# Patient Record
Sex: MALE | Race: WHITE | URBAN - METROPOLITAN AREA
[De-identification: names, ages, dates, MRNs, and addresses within clinical notes are randomized per-mention and may not be internally consistent; named-entity substitution may affect disease eponyms.]

---

## 2019-05-17 ENCOUNTER — HOSPITAL ENCOUNTER (OUTPATIENT)
Dept: NUTRITION | Age: 24
Discharge: HOME | End: 2019-05-17

## 2021-07-06 ENCOUNTER — OFFICE VISIT (OUTPATIENT)
Dept: ORTHOPEDIC SURGERY | Age: 26
End: 2021-07-06
Payer: COMMERCIAL

## 2021-07-06 VITALS — OXYGEN SATURATION: 98 % | HEIGHT: 72 IN | TEMPERATURE: 96.9 F | HEART RATE: 78 BPM

## 2021-07-06 DIAGNOSIS — S83.511A RUPTURE OF ANTERIOR CRUCIATE LIGAMENT OF RIGHT KNEE, INITIAL ENCOUNTER: Primary | ICD-10-CM

## 2021-07-06 DIAGNOSIS — S86.891A AVULSION OF RIGHT PATELLAR TENDON, INITIAL ENCOUNTER: ICD-10-CM

## 2021-07-06 PROCEDURE — 99204 OFFICE O/P NEW MOD 45 MIN: CPT | Performed by: PHYSICIAN ASSISTANT

## 2021-07-06 RX ORDER — NAPROXEN 500 MG/1
500 TABLET ORAL 2 TIMES DAILY WITH MEALS
COMMUNITY
End: 2021-08-25 | Stop reason: ALTCHOICE

## 2021-07-06 RX ORDER — ATENOLOL 50 MG/1
50 TABLET ORAL DAILY
COMMUNITY

## 2021-07-06 NOTE — PROGRESS NOTES
Gilbert Kamara  1995   Chief Complaint   Patient presents with    Knee Pain     right knee        HISTORY OF PRESENT ILLNESS  Gilbert Kamara is a 26 y.o. male who presents today for evaluation of acute right knee pain. Patient was swinging a baseball bat yesterday afternoon on July 5, 2021 he felt a pop in his knee and has been unable to bear weight since. He went to [facility] first who gave him an Ace wrap and crutches and naproxen. He states it hurts to bend his knee. He feels like his knee is unstable and is going to buckle on him pain is a 8/10. Has tried following treatments: Injections:NO; Brace:YES; Therapy:NO; Cane/Crutch:YES       Not on File     Past Medical History:   Diagnosis Date    Hypertension       Social History     Socioeconomic History    Marital status: UNKNOWN     Spouse name: Not on file    Number of children: Not on file    Years of education: Not on file    Highest education level: Not on file   Occupational History    Not on file   Tobacco Use    Smoking status: Never Smoker    Smokeless tobacco: Never Used   Substance and Sexual Activity    Alcohol use: Never    Drug use: Never    Sexual activity: Not on file   Other Topics Concern    Not on file   Social History Narrative    Not on file     Social Determinants of Health     Financial Resource Strain:     Difficulty of Paying Living Expenses:    Food Insecurity:     Worried About Running Out of Food in the Last Year:     [address] in the Last Year:    Transportation Needs:     Lack of Transportation (Medical):      Lack of Transportation (Non-Medical):    Physical Activity:     Days of Exercise per Week:     Minutes of Exercise per Session:    Stress:     Feeling of Stress :    Social Connections:     Frequency of Communication with Friends and Family:     Frequency of Social Gatherings with Friends and Family:     Attends Druze Services:     Active Member of Clubs or Organizations:     Attends Club or Organization Meetings:     Marital Status:    Intimate Partner Violence:     Fear of Current or Ex-Partner:     Emotionally Abused:     Physically Abused:     Sexually Abused:       Past Surgical History:   Procedure Laterality Date    HX WISDOM TEETH EXTRACTION        History reviewed. No pertinent family history. Current Outpatient Medications   Medication Sig    atenoloL (TENORMIN) 50 mg tablet Take 50 mg by mouth daily.  naproxen (NAPROSYN) 500 mg tablet Take 500 mg by mouth two (2) times daily (with meals). No current facility-administered medications for this visit. REVIEW OF SYSTEM   Patient denies: Weight loss, Fever/Chills, HA, Visual changes, Fatigue, Chest pain, SOB, Abdominal pain, N/V/D/C, Blood in stool or urine, Edema. Pertinent positive as above in HPI. All others were negative    PHYSICAL EXAM:   Visit Vitals  Pulse 78   Temp 96.9 °F (36.1 °C) (Temporal)   Ht 6' (1.829 m)   SpO2 98%     The patient is a well-developed, well-nourished male   in no acute distress. The patient is alert and oriented times three. The patient is alert and oriented times three. Mood and affect are normal.  LYMPHATIC: lymph nodes are not enlarged and are within normal limits  SKIN: normal in color and non tender to palpation. There are no bruises or abrasions noted. NEUROLOGICAL: Motor sensory exam is within normal limits. Reflexes are equal bilaterally.  There is normal sensation to pinprick and light touch  MUSCULOSKELETAL:  Examination Right knee   Skin Intact   Range of motion 5-60   Effusion +   Medial joint line tenderness +   Lateral joint line tenderness -   Tenderness Pes Bursa -   Tenderness insertion MCL -   Tenderness insertion LCL -   Sabras +   Patella crepitus -   Patella grind -   Lachman -   Pivot shift -   Anterior drawer +   Posterior drawer -   Varus stress -   Valgus stress -   Neurovascular Intact   Calf Swelling and Tenderness to Palpation -   Radha's Test -   Hamstring Cord Tightness -      +ttp patella tendon. Defect noted patella tendon. IMAGIN view xray taken at patient first read and reviewed by myself reveal no acute abnormalities    IMPRESSION:      ICD-10-CM ICD-9-CM    1. Rupture of anterior cruciate ligament of right knee, initial encounter  S83.511A 844.2 MRI KNEE RT WO CONT      REFERRAL TO PHYSICAL THERAPY   2. Avulsion of right patellar tendon, initial encounter  O80.985C 844.8         PLAN:   1. Patient with acute right knee pain. Concerns for patella tendon tear vs ACL tear vs patella dislocation. We will order a stat MRI. Continue naproxen. We will start physical therapy for range of motion  Risk factors include: n/a  2. No cortisone injection indicated today   3. Yes Physical/Occupational Therapy indicated today  4. Yes diagnostic test indicated today:   5. No durable medical equipment indicated today  6. No referral indicated today   7. Yes medications indicated today:   8.  No Narcotic indicated today    RTC after MRI     [facility]

## 2021-07-08 ENCOUNTER — TELEPHONE (OUTPATIENT)
Dept: ORTHOPEDIC SURGERY | Age: 26
End: 2021-07-08

## 2021-07-08 ENCOUNTER — HOSPITAL ENCOUNTER (OUTPATIENT)
Dept: MRI IMAGING | Age: 26
Discharge: HOME | End: 2021-07-08
Attending: PHYSICIAN ASSISTANT
Payer: COMMERCIAL

## 2021-07-08 DIAGNOSIS — S83.511A RUPTURE OF ANTERIOR CRUCIATE LIGAMENT OF RIGHT KNEE, INITIAL ENCOUNTER: ICD-10-CM

## 2021-07-08 PROCEDURE — 73721 MRI JNT OF LWR EXTRE W/O DYE: CPT

## 2021-07-12 ENCOUNTER — OFFICE VISIT (OUTPATIENT)
Dept: ORTHOPEDIC SURGERY | Age: 26
End: 2021-07-12
Payer: COMMERCIAL

## 2021-07-12 VITALS
WEIGHT: 275 LBS | HEART RATE: 71 BPM | BODY MASS INDEX: 37.25 KG/M2 | OXYGEN SATURATION: 99 % | TEMPERATURE: 97.5 F | HEIGHT: 72 IN

## 2021-07-12 DIAGNOSIS — S83.91XA SPRAIN OF RIGHT KNEE, UNSPECIFIED LIGAMENT, INITIAL ENCOUNTER: Primary | ICD-10-CM

## 2021-07-12 PROCEDURE — 99213 OFFICE O/P EST LOW 20 MIN: CPT | Performed by: ORTHOPAEDIC SURGERY

## 2021-07-12 NOTE — PROGRESS NOTES
Miryam Capone  1995   Chief Complaint   Patient presents with    Knee Pain     right, injury, MRI review        HISTORY OF PRESENT ILLNESS  Miryam Capone is a 26 y.o. male who presents today for reevaluation of right knee and MRI review. Patient rates pain as 5/10 today. Patient was swinging a baseball bat  on 7/05/2021 when he felt a pop in his knee and has been unable to bear weight since. He went to [facility] who gave him an Ace wrap and crutches and naproxen. Presents today ambulating with crutches. He states it hurts to bend his knee. He feels like his knee is unstable and is going to buckle on him. Has not been bending his knee. Patient denies any fever, chills, chest pain, shortness of breath or calf pain. The remainder of the review of systems is negative. There are no new illness or injuries to report since last seen in the office. There are no changes to medications, allergies, family or social history. Pain Assessment  7/12/2021   Location of Pain Knee   Location Modifiers Right   Severity of Pain 5   Quality of Pain Throbbing;Aching; Sharp   Duration of Pain Persistent   Frequency of Pain Constant   Date Pain First Started -   Aggravating Factors Other (Comment)   Aggravating Factors Comment daily activity   Limiting Behavior Yes   Relieving Factors Nothing   Result of Injury Yes   Work-Related Injury No   Type of Injury Fall   Type of Injury Comment -       PHYSICAL EXAM:   Visit Vitals  Pulse 71   Temp 97.5 °F (36.4 °C) (Skin)   Ht 6' (1.829 m)   Wt 275 lb (124.7 kg)   SpO2 99%   BMI 37.30 kg/m²     The patient is a well-developed, well-nourished male   in no acute distress. The patient is alert and oriented times three. The patient is alert and oriented times three. Mood and affect are normal.  LYMPHATIC: lymph nodes are not enlarged and are within normal limits  SKIN: normal in color and non tender to palpation. There are no bruises or abrasions noted.    NEUROLOGICAL: Motor sensory exam is within normal limits. Reflexes are equal bilaterally. There is normal sensation to pinprick and light touch  MUSCULOSKELETAL:  Examination Right knee   Skin Intact   Range of motion 5-60   Effusion -   Medial joint line tenderness +   Lateral joint line tenderness -   Tenderness Pes Bursa -   Tenderness insertion MCL -   Tenderness insertion LCL -   Sabras +   Patella crepitus -   Patella grind -   Lachman -   Pivot shift -   Anterior drawer -   Posterior drawer -   Varus stress -   Valgus stress -   Neurovascular Intact   Calf Swelling and Tenderness to Palpation -   Radha's Test -   Hamstring Cord Tightness -       +ttp patella tendon. Defect noted patella tendon.     IMAGING: MRI of right knee dated 7/08/2021 was reviewed and read by Dr. Krishna Lopez:   IMPRESSION:  1. No ACL tear. No patellar tendon tear. 2. Grade 2-3 chondral injury in the lateral patellar facet. 3. Lateralized patellar tendon and mild edema in superolateral Hoffa's fat pad which can be seen with patellar maltracking and impingement. There is also lateral patellar glide. 4. Mild contusion in the lateral tibial plateau. No fracture. 5. Lateral meniscus anterior horn and root intrasubstance increased signal with minimal extension to the peripheral upper surface suggesting small incomplete vertical tear. 4 view xray taken at patient first read and reviewed by myself reveal no acute abnormalities      IMPRESSION:      ICD-10-CM ICD-9-CM    1. Sprain of right knee, unspecified ligament, initial encounter  S83. 91XA 844.9         PLAN:   1. Pt presents today with right knee pain due to an MRI-documented knee sprain. Start PT, was given order at last OV. D/c use of crutches after 2 days. Will see him back in 3 weeks. Risk factors include: n/a  2. No ultrasound exam indicated today  3. No cortisone injection indicated today   4. No Physical/Occupational Therapy indicated today  5. No diagnostic test indicated today:   6. No durable medical equipment indicated today  7. No referral indicated today   8. No medications indicated today:   9. No Narcotic indicated today       RTC 3 weeks      Scribed by Cleveland May) as dictated by Erin Flanagan MD    I, Dr. Erin Flanagan, confirm that all documentation is accurate.     Erin Flanagan M.D.   [facility]

## 2021-07-22 ENCOUNTER — HOSPITAL ENCOUNTER (OUTPATIENT)
Dept: PHYSICAL THERAPY | Age: 26
Discharge: HOME | End: 2021-07-22
Payer: COMMERCIAL

## 2021-07-22 PROCEDURE — 97014 ELECTRIC STIMULATION THERAPY: CPT

## 2021-07-22 PROCEDURE — 97140 MANUAL THERAPY 1/> REGIONS: CPT

## 2021-07-22 PROCEDURE — 97162 PT EVAL MOD COMPLEX 30 MIN: CPT

## 2021-07-22 PROCEDURE — 97110 THERAPEUTIC EXERCISES: CPT

## 2021-07-22 NOTE — PROGRESS NOTES
[address] [facility] AT [address], [address] [place] [address] Road  Phone: [phone]   [phone]  PLAN OF CARE / [address] [place] [address] PHYSICAL THERAPY SERVICES  Patient Name: Kar Pan : 1995   Medical   Diagnosis: Right knee pain [M25.561] Treatment Diagnosis: Right knee pain [M25.561]   Onset Date: 2021     [facility]   Prior Hospitalization: See medical history Provider #: [id number]   Prior Level of Function: No limitations to high level activities. Recreational activities include basketball, football. Works full time as LPN.  [address] 2 story home (2nd floor bedroom)   Comorbidities: HTN, BMI >30   Medications: Verified on Patient Summary List   The Plan of Care and following information is based on the information from the initial evaluation.   ===========================================================================================  Assessment / key information:  Pt is a 26y.o. year old male with subjective complaints of R knee pain s/p twisting injury playing football on 21 in which he felt a \"pop\" and immediate pain/inability to wb. [facility] and had [facility]ray (-) for fracture and referred to ortho. Pt was seen next day by ortho; MRI revealed:     IMPRESSION  1. No ACL tear. No patellar tendon tear. 2. Grade 2-3 chondral injury in the lateral patellar facet. 3. Lateralized patellar tendon and mild edema in superolateral Hoffa's fat pad  which can be seen with patellar maltracking and impingement. There is also  lateral patellar glide. 4. Mild contusion in the lateral tibial plateau. No fracture. 5. Lateral meniscus anterior horn and root intrasubstance increased signal with  minimal extension to the peripheral upper surface suggesting small incomplete  vertical tear. Current pain is rated as 4 to 10/10; localized primarily to sarabjit/medial knee. Current functional limitations: ambulation (b/l axillary crutches), stairs (NR with BUE support), standing, sleeping (currently unable to tolerate s/l R/L). FOTO score= 23/100 indicating 77% impairment to functional activities. Today's evaulation is significant for   POSTURE/OBSERVATION:  R knee visualized with minimal edema noted to joint line and infrapatellar region. Pt maintains R knee extended on floor in sitting. Limited wb'ing through RLE with STS and standing. FUNCTIONAL ASSESSMENT: Pt ambulating with b/l axillary crutches, slight flexed fwd posture, PWB RLE. Stairs with heavy wb'ing b/l UE's, PWB RLE, NR leading up with left, down with RLE; pt notes more difficulty to descend. ROM KNEE:  R -5 to 96/113 (end range pain);  L +10 to 115/120. STRENGTH  Hip flex 4-/5 (p!), knee ext/flex 4-/5 (p!). Ant tib 5/5. LLE grossly 5/5 throughout. QS R Poor  SPECIAL TESTS: n/a; Deferred ligamentous testing 2/2 apprehension/pain irritability. ADDITIONAL FINDINGS: Patellar glides grade 2 all planes with pt noting discomfort sup/inf. Pt ttp to medial joint line, medial patella and patellar tendon. Moderate tightness to hamstrings. These findings are supportive for diagnosis of R knee pain. Pt will be a good candidate for skilled PT to address these impairments and promote return to normal ADLs and functional mobility for improved quality of life.    ===========================================================================================  Eval Complexity: History MEDIUM  Complexity : 1-2 comorbidities / personal factors will impact the outcome/ POC ;  Examination  MEDIUM Complexity : 3 Standardized tests and measures addressing body structure, function, activity limitation and / or participation in recreation ; Presentation MEDIUM Complexity : Evolving with changing characteristics ;   Decision Making HIGH Complexity : FOTO score of 1- 25 ; Overall Complexity MEDIUM  Problem List: pain affecting function, decrease ROM, decrease strength, edema affecting function, impaired gait/ balance, decrease ADL/ functional abilitiies, decrease activity tolerance, decrease flexibility/ joint mobility and decrease transfer abilities   Treatment Plan may include any combination of the following: Therapeutic exercise, Therapeutic activities, Neuromuscular re-education, Physical agent/modality, Gait/balance training, Manual therapy, Patient education, Self Care training, Functional mobility training and Stair training  Patient / Family readiness to learn indicated by: asking questions, trying to perform skills and interest  Persons(s) to be included in education: patient (P)  Barriers to Learning/Limitations: None  Measures taken, if barriers to learning:    Patient Goal (s): \"walk without crutches, roll over in bed, go up/down steps\"   Patient self reported health status: good  Rehabilitation Potential: good   Short Term Goals: To be accomplished in  4  treatments:  1. Pt will be educated in appropriate HEP to decrease pain, increase ROM, increase strength and return pt to PLOF. 2. Pt will increase R knee AROM 0 to 120 in order to increase ease with ADL's and transfers. 3. Pt will demonstrate Good isometric QS for improved stability with walking. 4. Pt will report at least 25% functional improvement with sleeping/rolling in bed.  Long Term Goals: To be accomplished in  6  weeks:  1. Pt will improve FOTO score to >/= 66 to demo a significant improvement in functional activity tolerance. 2. Pt will demonstrate R knee extension strength 5/5 to promote ability to ambulate reciprocally up/down flight of stairs. 3. Pt will be able to SLS R >/= 30\" on firm surface to promote ability to ambulate community distances without AD.     Frequency / Duration:   Patient to be seen  2  times per week for 6  weeks:  Patient / Caregiver education and instruction: self care, activity modification, brace/ splint application and exercises  Therapist Signature: Georgie Alcala, JOHN Date: 2/88/9825   Certification Period: n/a Time: 8:32 AM   ===========================================================================================  I certify that the above Physical Therapy Services are being furnished while the patient is under my care. I agree with the treatment plan and certify that this therapy is necessary. Physician Signature:        Date:       Time:        ROCKY Hess  Please sign and return to [facility] MEDICAL [facility] AT [place] [facility] GEROPSYCH UNIT or you may fax the signed copy to [phone]. Thank you.

## 2021-07-22 NOTE — PROGRESS NOTES
PHYSICAL THERAPY - DAILY TREATMENT NOTE    Patient Name: Tana Cruz        Date: 2021  : 1995   yes Patient  Verified  Visit #:     Insurance: Payor: Nathaniel Hubbard / Plan: [address] [place] [address] / Product Type: PPO /      In time: 10:00 Out time: 10:57   Total Treatment Time: 57     Medicare/BCBS Time Tracking (below)   Total Timed Codes (min):  47 1:1 Treatment Time:  47     TREATMENT AREA =  Right knee pain [M25.561]    SUBJECTIVE  Pain Level (on 0 to 10 scale):  4  / 10   Medication Changes/New allergies or changes in medical history, any new surgeries or procedures?    no  If yes, update Summary List   Subjective Functional Status/Changes:  []  No changes reported     See POC          OBJECTIVE    See exam on chart for details on objective findings      Modalities Rationale:     decrease inflammation, decrease pain and increase muscle contraction/control to improve patient's ability to perform functional mobility/amb  8 min [x] Estim, type/location: [facility] 10/30\" to R VMO/RF with concurrent QS                                     []  att     [x]  unatt     []  w/US     []  w/ice    []  w/heat    min []  Mechanical Traction: type/lbs                   []  pro   []  sup   []  int   []  cont    []  before manual    []  after manual    min []  Ultrasound, settings/location:      min []  Iontophoresis w/ dexamethasone, location:                                               []  take home patch       []  in clinic   10 min [x]  Ice     []  Heat    location/position: R knee in long sitting    min []  Vasopneumatic Device, press/temp:    If using vaso (only need to measure limb vaso being performed on)      pre-treatment girth :       post-treatment girth :       measured at (landmark location) :      min []  Other:    [] Skin assessment post-treatment (if applicable):    []  intact    []  redness- no adverse reaction                  []redness  adverse reaction:      12 min Therapeutic Exercise:  [x]  See flow sheet   Rationale:      increase ROM and increase strength to improve the patients ability to perform functional mobility/ambulation     10 min Manual Therapy: Patellar mobs grade 2-3. Knee flex/ext stretching in supine. Manual hamstring stretch    Rationale:      decrease pain, increase ROM and increase tissue extensibility to improve patient's ability to perform functional mobility/ambulation  The manual therapy interventions were performed at a separate and distinct time from the therapeutic activities interventions. 2 min Gait Training:    -up/down 4 steps with vc's for sequencing NR with L leading up, R leading down; cues to increase wb'ing to UE's while descending with pt noting improved tolerance   Rationale: To improve ambulation safety and efficiency in order to improve patient's ability to safely ambulate at home for self care. Billed With/As:   [x] TE   [] TA   [] Neuro   [] Self Care Patient Education: [x] Review HEP    [] Progressed/Changed HEP based on:   [] positioning   [] body mechanics   [] transfers   [] heat/ice application    [] other:      Other Objective/Functional Measures:    Issued HEP     Post Treatment Pain Level (on 0 to 10) scale:   0  / 10     ASSESSMENT  Assessment/Changes in Function:     See POC     []  See Progress Note/Recertification   Patient will continue to benefit from skilled PT services to modify and progress therapeutic interventions, address functional mobility deficits, address ROM deficits, address strength deficits, analyze and address soft tissue restrictions, analyze and cue movement patterns, analyze and modify body mechanics/ergonomics, assess and modify postural abnormalities, address imbalance/dizziness and instruct in home and community integration to attain remaining goals.    Progress toward goals / Updated goals:    See POC     PLAN  []  Upgrade activities as tolerated yes Continue plan of care   []  Discharge due to :    []  Other:      Therapist: Media .  Ryan Austni, PT    Date: 2021 Time: 8:31 AM     Future Appointments   Date Time Provider Chris Gustafson   2021 10:00 AM Gretacarrol Germain [facility]   2021  2:30 PM ROCKY Guillermo [facility]   8/3/2021  4:00 PM Lisbet Cevallos [facility]   2021 11:45 AM Yung Chou, PT [facility]   8/10/2021 11:30 AM Quirino Dunbar PTA [facility]   2021 10:45 AM Quirino Dunbar PTA [facility]   2021 10:45 AM Quirino Dunbar PTA [facility]   2021 11:15 AM Robbin French [facility]   2021 11:30 AM Quirino Dunbar PTA [facility]   2021 11:15 AM Robbin French [facility]

## 2021-07-23 ENCOUNTER — HOSPITAL ENCOUNTER (OUTPATIENT)
Dept: PHYSICAL THERAPY | Age: 26
Discharge: HOME | End: 2021-07-23
Payer: COMMERCIAL

## 2021-07-23 PROCEDURE — 97110 THERAPEUTIC EXERCISES: CPT

## 2021-07-23 PROCEDURE — 97140 MANUAL THERAPY 1/> REGIONS: CPT

## 2021-07-23 PROCEDURE — 97112 NEUROMUSCULAR REEDUCATION: CPT

## 2021-07-23 NOTE — PROGRESS NOTES
PHYSICAL THERAPY - DAILY TREATMENT NOTE    Patient Name: Nicky Young        Date: 2021  : 1995   yes Patient  Verified  Visit #:     Insurance: Payor: Pete Perry / Plan: [address] [place] [address] / Product Type: PPO /      In time: 10:02 Out time: 11:12   Total Treatment Time: 70     Medicare/BCBS Time Tracking (below)   Total Timed Codes (min):  60 1:1 Treatment Time:  60     TREATMENT AREA =  Right knee pain [M25.561]    SUBJECTIVE  Pain Level (on 0 to 10 scale):  0  / 10   Medication Changes/New allergies or changes in medical history, any new surgeries or procedures?    no  If yes, update Summary List   Subjective Functional Status/Changes:  []  No changes reported   Patient has not started HEP yet as his evaluation was yesterday. Patient reports some fear avoidance as he does not want to re-injure himself.       OBJECTIVE    Modalities Rationale:     decrease inflammation, decrease pain and increase muscle contraction/control to improve patient's ability to perform functional mobility/amb  8 min [x] Estim, type/location: [facility], 10/20\" to R VMO/RF with concurrent QS                                     []  att     [x]  unatt     []  w/US     []  w/ice    []  w/heat    min []  Mechanical Traction: type/lbs                   []  pro   []  sup   []  int   []  cont    []  before manual    []  after manual    min []  Ultrasound, settings/location:      min []  Iontophoresis w/ dexamethasone, location:                                               []  take home patch       []  in clinic   10+5 set up min [x]  Ice     []  Heat    location/position: R knee in long sitting    min []  Vasopneumatic Device, press/temp:    If using vaso (only need to measure limb vaso being performed on)      pre-treatment girth : 40cm       post-treatment girth :       measured at (landmark location) :      min []  Other:    [x] Skin assessment post-treatment (if applicable):    [x]  intact    []  redness- no adverse reaction                  []redness  adverse reaction:      37 min Therapeutic Exercise:  [x]  See flow sheet   Rationale:      increase ROM and increase strength to improve the patients ability to perform functional mobility/ambulation     10 min Manual Therapy: Patellar mobs grade 2-3. Knee flex/ext stretching in supine. Manual hamstring stretch    Rationale:      decrease pain, increase ROM and increase tissue extensibility to improve patient's ability to perform functional mobility/ambulation  The manual therapy interventions were performed at a separate and distinct time from the therapeutic activities interventions. - min Gait Training:    -up/down 4 steps with vc's for sequencing NR with L leading up, R leading down; cues to increase wb'ing to UE's while descending with pt noting improved tolerance   Rationale: To improve ambulation safety and efficiency in order to improve patient's ability to safely ambulate at home for self care. Billed With/As:   [x] TE   [] TA   [] Neuro   [] Self Care Patient Education: [x] Review HEP    [] Progressed/Changed HEP based on:   [] positioning   [] body mechanics   [] transfers   [] heat/ice application    [] other:      Other Objective/Functional Measures:   - demo and cuing for therex  - initial hesitancy with TG  - full revolution on bike warmup   Post Treatment Pain Level (on 0 to 10) scale:   3  / 10     ASSESSMENT  Assessment/Changes in Function:   Initiated PT POC today per flow sheet, requiring vc and demo 100% of the time for proper form and technique with TE. Progressed [facility] ES to 10/20\".     []  See Progress Note/Recertification   Patient will continue to benefit from skilled PT services to modify and progress therapeutic interventions, address functional mobility deficits, address ROM deficits, address strength deficits, analyze and address soft tissue restrictions, analyze and cue movement patterns, analyze and modify body mechanics/ergonomics, assess and modify postural abnormalities, address imbalance/dizziness and instruct in home and community integration to attain remaining goals. Progress toward goals / Updated goals:  First f/u since evaluation.       PLAN  []  Upgrade activities as tolerated yes Continue plan of care   []  Discharge due to :    []  Other:      Therapist: ZOFIA Murphy    Date: 7/23/2021 Time: 11:12 AM     Future Appointments   Date Time Provider Chris Gustafson   7/23/2021 10:00 AM Patti Duran [facility]   8/2/2021  2:30 PM ROCKY Shen [facility] AMB   8/3/2021  4:00 PM Rose Marie Romo [facility]   8/5/2021 11:45 AM Shona Onofre, PT [facility]   8/10/2021 11:30 AM Nichole Mass, PTA [facility]   8/12/2021 10:45 AM [place] Mass, PTA [facility]   8/17/2021 10:45 AM Nichole Mass, PTA [facility]   8/19/2021 11:15 AM Santos Zavala [facility]   8/24/2021 11:30 AM [place] Mass, PTA [facility]   8/26/2021 11:15 AM Santos Zavala [facility]

## 2021-07-26 ENCOUNTER — HOSPITAL ENCOUNTER (OUTPATIENT)
Dept: PHYSICAL THERAPY | Age: 26
Discharge: HOME | End: 2021-07-26
Payer: COMMERCIAL

## 2021-07-26 PROCEDURE — 97112 NEUROMUSCULAR REEDUCATION: CPT

## 2021-07-26 PROCEDURE — 97110 THERAPEUTIC EXERCISES: CPT

## 2021-07-26 NOTE — PROGRESS NOTES
PHYSICAL THERAPY - DAILY TREATMENT NOTE    Patient Name: Tresa Dominique        Date: 2021  : 1995   yes Patient  Verified  Visit #:   3   of   12  Insurance: Payor: Dago Matthews / Plan: [address] [place] [address] / Product Type: PPO /      In time: 1:45 Out time: 2:30   Total Treatment Time: 45     Medicare/BCBS Time Tracking (below)   Total Timed Codes (min):  45 1:1 Treatment Time:  45     TREATMENT AREA =  Right knee pain [M25.561]    SUBJECTIVE  Pain Level (on 0 to 10 scale):  0  / 10   Medication Changes/New allergies or changes in medical history, any new surgeries or procedures?    no  If yes, update Summary List   Subjective Functional Status/Changes:  []  No changes reported   My leg was just really tired after last session. It still feels like it is going to hyperextend if I put too much weight on it, it is weak. Pt reports he was supposed to be off the crutches the Wednesday after his initial appt with Dr. Shantelle Alicia but there was no way he could, he still can't d/t quad weakness and pain. Next appt is with the PA on 21.      OBJECTIVE    Modalities Rationale:     decrease inflammation, decrease pain and increase muscle contraction/control to improve patient's ability to perform functional mobility/amb  10 min [x] Estim, type/location: [facility], 10/10\" to R VMO/RF with concurrent SAQ                                     []  att     [x]  unatt     []  w/US     [x]  w/ice    []  w/heat    min []  Mechanical Traction: type/lbs                   []  pro   []  sup   []  int   []  cont    []  before manual    []  after manual    min []  Ultrasound, settings/location:      min []  Iontophoresis w/ dexamethasone, location:                                               []  take home patch       []  in clinic   w/ES min [x]  Ice     []  Heat    location/position:     min []  Vasopneumatic Device, press/temp:    If using vaso (only need to measure limb vaso being performed on)      pre-treatment girth :       post-treatment girth :       measured at (landmark location) :      min []  Other:    [x] Skin assessment post-treatment (if applicable):    [x]  intact    [x]  redness- no adverse reaction                  []redness  adverse reaction:      35 min Therapeutic Exercise:  [x]  See flow sheet   Rationale:      increase ROM and increase strength to improve the patients ability to perform functional mobility/ambulation     NI min Manual Therapy: Patellar mobs grade 2-3. Knee flex/ext stretching in supine. Manual hamstring stretch    Rationale:      decrease pain, increase ROM and increase tissue extensibility to improve patient's ability to perform functional mobility/ambulation  The manual therapy interventions were performed at a separate and distinct time from the therapeutic activities interventions. Billed With/As:   [x] TE   [] TA   [] Neuro   [] Self Care Patient Education: [x] Review HEP    [] Progressed/Changed HEP based on:   [] positioning   [] body mechanics   [] transfers   [] heat/ice application    [] other:      Other Objective/Functional Measures:   - decreased fear avoidance today  - increased pain with lateral weight shifts  - pt reports worst exercise is the HS S  - light progression of reps as noted on FS  - decreased effort required for bed mobility today    AROM: 0-122 deg   Post Treatment Pain Level (on 0 to 10) scale:   3-4 / 10     ASSESSMENT  Assessment/Changes in Function:   Minimal cuing for technique with therex today. Progressed [place] ES to 10/10\" with progression from QS to SAQ. Patient able to perform SLR x 4 without (A) today and demonstrates full AROM of right knee. Focus on strengthening quad to allow patient to progress to ambulation without AD.    []  See Progress Note/Recertification   Patient will continue to benefit from skilled PT services to modify and progress therapeutic interventions, address functional mobility deficits, address ROM deficits, address strength deficits, analyze and address soft tissue restrictions, analyze and cue movement patterns, analyze and modify body mechanics/ergonomics, assess and modify postural abnormalities, address imbalance/dizziness and instruct in home and community integration to attain remaining goals. Progress toward goals / Updated goals: · Short Term Goals: To be accomplished in  4  treatments:  1. Pt will be educated in appropriate HEP to decrease pain, increase ROM, increase strength and return pt to PLOF. MET 7/26/21  2. Pt will increase R knee AROM 0 to 120 in order to increase ease with ADL's and transfers. MET 7/26/21  3. Pt will demonstrate Good isometric QS for improved stability with walking. Progressing  4. Pt will report at least 25% functional improvement with sleeping/rolling in bed. Progressing     · Long Term Goals: To be accomplished in  6  weeks:  1. Pt will improve FOTO score to >/= 66 to demo a significant improvement in functional activity tolerance. 2. Pt will demonstrate R knee extension strength 5/5 to promote ability to ambulate reciprocally up/down flight of stairs. 3. Pt will be able to SLS R >/= 30\" on firm surface to promote ability to ambulate community distances without AD.      PLAN  []  Upgrade activities as tolerated yes Continue plan of care   []  Discharge due to :    []  Other:      Therapist: ZOFIA Le    Date: 7/26/2021 Time: 2:30 PM     Future Appointments   Date Time Provider Chris Gustafson   7/26/2021  1:45 PM Francisco Marie [facility]   8/2/2021  2:30 PM ROCKY Shah [facility] AMB   8/3/2021  4:00 PM Wendie Romo [facility]   8/5/2021 11:45 AM Mike Cope, PT [facility]   8/10/2021 11:30 AM Cash Osman, PTA [facility]   8/12/2021 10:45 AM Cash Osman, PTA [facility]   8/17/2021 10:45 AM Cash Osman, PTA [facility]   8/19/2021 11:15 AM Ashley Navas [facility]   8/24/2021 11:30 AM Cash Osman PTA [facility]   8/26/2021 11:15 AM [facility]

## 2021-07-29 ENCOUNTER — HOSPITAL ENCOUNTER (OUTPATIENT)
Dept: PHYSICAL THERAPY | Age: 26
Discharge: HOME | End: 2021-07-29
Payer: COMMERCIAL

## 2021-07-29 PROCEDURE — 97014 ELECTRIC STIMULATION THERAPY: CPT

## 2021-07-29 PROCEDURE — 97110 THERAPEUTIC EXERCISES: CPT

## 2021-07-29 PROCEDURE — 97116 GAIT TRAINING THERAPY: CPT

## 2021-07-29 NOTE — PROGRESS NOTES
PHYSICAL THERAPY - DAILY TREATMENT NOTE    Patient Name: Kassie Cordova        Date: 2021  : 1995   yes Patient  Verified  Visit #:     Insurance: Payor: Jailyn Wylie / Plan: [address] [place] [address] / Product Type: PPO /      In time: 1:30 Out time: 2:44   Total Treatment Time: 74     Medicare/BCBS Time Tracking (below)   Total Timed Codes (min):  56 1:1 Treatment Time:  46     TREATMENT AREA =  Right knee pain [M25.561]    SUBJECTIVE  Pain Level (on 0 to 10 scale):  0  / 10   Medication Changes/New allergies or changes in medical history, any new surgeries or procedures?    no  If yes, update Summary List   Subjective Functional Status/Changes:  []  No changes reported   Pt states he still cannot tolerate putting full weight on his RLE. Reports some soreness after HEP.        OBJECTIVE    Modalities Rationale:     decrease inflammation, decrease pain and increase muscle contraction/control to improve patient's ability to perform functional mobility/amb  8 min [x] Estim, type/location: [facility], 10/10\" to R VMO/RF with concurrent SAQ                                     []  att     [x]  unatt     []  w/US     []  w/ice    []  w/heat    min []  Mechanical Traction: type/lbs                   []  pro   []  sup   []  int   []  cont    []  before manual    []  after manual    min []  Ultrasound, settings/location:      min []  Iontophoresis w/ dexamethasone, location:                                               []  take home patch       []  in clinic   10 min [x]  Ice     []  Heat    location/position: R knee in supine    min []  Vasopneumatic Device, press/temp:    If using vaso (only need to measure limb vaso being performed on)      pre-treatment girth :       post-treatment girth :       measured at (landmark location) :      min []  Other:    [x] Skin assessment post-treatment (if applicable):    [x]  intact    [x]  redness- no adverse reaction                  []redness  adverse reaction:      23 1:1  (33) min Therapeutic Exercise:  [x]  See flow sheet   Rationale:      increase ROM and increase strength to improve the patients ability to perform functional mobility/ambulation     7 min Manual Therapy: Patellar mobs grade 2-3. Knee flex/ext stretching in supine. Rationale:      decrease pain, increase ROM and increase tissue extensibility to improve patient's ability to perform functional mobility/ambulation  The manual therapy interventions were performed at a separate and distinct time from the therapeutic activities interventions. 16 min Gait Training:   -wt shifting: laterally with scale to RLE; tolerates ~120 lbs initially  -TKE with OTB  -wt shifting laterally again, up to 180 lbs x 10 reps.  -Amb. 22' with L crutch and R counter support, cues for TKE with wb'ing on RLE and decreased UE wb'ing (to mimic wt shift tolerance immediately preceeding gait). Amb. 25' with b/l crutches and cues to maintain R QS with wb'ing and decrease UE wb'ing support   Rationale: To increase safety and independence with ambulation with least restrictive AD and decreased fall risk. Billed With/As:   [x] TE   [] TA   [] Neuro   [] Self Care Patient Education: [x] Review HEP    [] Progressed/Changed HEP based on:   [] positioning   [] body mechanics   [] transfers   [] heat/ice application    [] other:      Other Objective/Functional Measures:   - bike for AAROM; full revolutions  -advanced to eccentric TG squats  -attempted SL heel raises and hamstring curl, however knee pain and d/c'd  -wt shifts to R initially 120 lb with R knee buckling; stopped and performed TKE with OTB; resumed and pt able to increase to 180 lb    AAROM flex to 122   Post Treatment Pain Level (on 0 to 10) scale:   3-4 / 10     ASSESSMENT  Assessment/Changes in Function:   Pt demonstrates functional quad weakness limiting ability to increase RLE wb'ing and ability to d/c use of crutches.  Pt requires min encouragement for new exercises/advancements 2/2 apprehension for R knee pain/tolerance. Pt encouraged to d/c reliance on knee sleeve 2/2 not providing significant compression/knee stability and want to avoid feeling dependent on sleeve for function. []  See Progress Note/Recertification   Patient will continue to benefit from skilled PT services to modify and progress therapeutic interventions, address functional mobility deficits, address ROM deficits, address strength deficits, analyze and address soft tissue restrictions, analyze and cue movement patterns, analyze and modify body mechanics/ergonomics, assess and modify postural abnormalities, address imbalance/dizziness and instruct in home and community integration to attain remaining goals. Progress toward goals / Updated goals:    Plan formal re-assessment NV prior to MD visit    · Short Term Goals: To be accomplished in  4  treatments:  1. Pt will be educated in appropriate HEP to decrease pain, increase ROM, increase strength and return pt to PLOF. MET 7/26/21  2. Pt will increase R knee AROM 0 to 120 in order to increase ease with ADL's and transfers. MET 7/26/21  3. Pt will demonstrate Good isometric QS for improved stability with walking. Progressing  4. Pt will report at least 25% functional improvement with sleeping/rolling in bed. Progressing     · Long Term Goals: To be accomplished in  6  weeks:  1. Pt will improve FOTO score to >/= 66 to demo a significant improvement in functional activity tolerance. 2. Pt will demonstrate R knee extension strength 5/5 to promote ability to ambulate reciprocally up/down flight of stairs. 3. Pt will be able to SLS R >/= 30\" on firm surface to promote ability to ambulate community distances without AD. PLAN  []  Upgrade activities as tolerated yes Continue plan of care   []  Discharge due to :    []  Other:      Therapist: Melissa Kearney.  Armanda Favre, PT, LPTA    Date: 7/29/2021 Time: 2:55 PM      Future Appointments   Date Time Provider Chris Gustafson   7/29/2021  1:30 PM Deandra Pineda., PT [facility]   8/3/2021  4:00 PM Natasha Adam [facility]   8/4/2021  2:00 PM Mariajose Vazquez [facility] BS AMB   8/5/2021 11:45 AM Deandra Pineda., PT [facility]   8/10/2021 11:30 AM eJt Unger, PTA [facility]   8/12/2021 10:45 AM Jet Unger, PTA [facility]   8/17/2021 10:45 AM Jet Unger, PTA [facility]   8/19/2021 11:15 AM Natasha Adam [facility]   8/24/2021 11:30 AM Jet Unger, PTA [facility]   8/26/2021 11:15 AM Natasha Adam [facility]

## 2021-08-03 ENCOUNTER — HOSPITAL ENCOUNTER (OUTPATIENT)
Dept: PHYSICAL THERAPY | Age: 26
Discharge: HOME | End: 2021-08-03
Payer: COMMERCIAL

## 2021-08-03 PROCEDURE — 97110 THERAPEUTIC EXERCISES: CPT

## 2021-08-03 PROCEDURE — 97014 ELECTRIC STIMULATION THERAPY: CPT

## 2021-08-03 NOTE — PROGRESS NOTES
PHYSICAL THERAPY - DAILY TREATMENT NOTE    Patient Name: Jude Devine        Date: 8/3/2021  : 1995   yes Patient  Verified  Visit #:      12  Insurance: Payor: Adama Estrella / Plan: [address] [place] [address] / Product Type: PPO /      In time: 401p Out time: 507   Total Treatment Time: 66     Medicare/BCBS Time Tracking (below)   Total Timed Codes (min):  46 1:1 Treatment Time:  46     TREATMENT AREA =  Right knee pain [M25.561]    SUBJECTIVE  Pain Level (on 0 to 10 scale):  0  / 10   Medication Changes/New allergies or changes in medical history, any new surgeries or procedures?    no  If yes, update Summary List   Subjective Functional Status/Changes:  []  No changes reported   Pt reports he continues to have pain and feelings of instability in his knee, he is not able to put his full weight on it       OBJECTIVE    Modalities Rationale:     decrease inflammation, decrease pain and increase muscle contraction/control to improve patient's ability to perform functional mobility/amb  10 min [x] Estim, type/location: [facility], 10/10\" to R VMO/RF with concurrent SAQ                                     []  att     [x]  unatt     []  w/US     []  w/ice    []  w/heat    min []  Mechanical Traction: type/lbs                   []  pro   []  sup   []  int   []  cont    []  before manual    []  after manual    min []  Ultrasound, settings/location:      min []  Iontophoresis w/ dexamethasone, location:                                               []  take home patch       []  in clinic    min []  Ice     []  Heat    location/position:    10 NC min [x]  Vasopneumatic Device, press/temp: To R knee in long sit per pt request, NC per insurance restrictions   If using vaso (only need to measure limb vaso being performed on)      pre-treatment girth :       post-treatment girth :       measured at (landmark location) :      min []  Other:    [x] Skin assessment post-treatment (if applicable):    [x]  intact [x]  redness- no adverse reaction                  []redness  adverse reaction:      46  min Therapeutic Exercise:  [x]  See flow sheet   Rationale:      increase ROM and increase strength to improve the patients ability to perform functional mobility/ambulation     0 min Manual Therapy: Patellar mobs grade 2-3. Knee flex/ext stretching in supine. Rationale:      decrease pain, increase ROM and increase tissue extensibility to improve patient's ability to perform functional mobility/ambulation  The manual therapy interventions were performed at a separate and distinct time from the therapeutic activities interventions. min Gait Training:      Rationale: To increase safety and independence with ambulation with least restrictive AD and decreased fall risk. Billed With/As:   [x] TE   [] TA   [] Neuro   [] Self Care Patient Education: [x] Review HEP    [] Progressed/Changed HEP based on:   [] positioning   [] body mechanics   [] transfers   [] heat/ice application    [] other:      Other Objective/Functional Measures:   See PN   Post Treatment Pain Level (on 0 to 10) scale:   2/ 10     ASSESSMENT  Assessment/Changes in Function:   See PN   []  See Progress Note/Recertification   Patient will continue to benefit from skilled PT services to modify and progress therapeutic interventions, address functional mobility deficits, address ROM deficits, address strength deficits, analyze and address soft tissue restrictions, analyze and cue movement patterns, analyze and modify body mechanics/ergonomics, assess and modify postural abnormalities, address imbalance/dizziness and instruct in home and community integration to attain remaining goals.    Progress toward goals / Updated goals:  See PN     PLAN  []  Upgrade activities as tolerated yes Continue plan of care   []  Discharge due to :    []  Other:      Therapist: ZOFIA Guardado    Date: 8/3/2021 Time: 2:55 PM      Future Appointments   Date Time Provider Chris Helmi   8/3/2021  4:00 PM Kamran Bal [facility]   8/4/2021  2:00 [facility]   8/5/2021 11:45 AM Eric Prado, PT [facility]   8/10/2021 11:30 AM Lucrezia Alessia, PTA [facility]   8/12/2021 10:45 AM Lucrezia Alessia, PTA [facility]   8/17/2021 10:45 AM Lucrezia Alessia, PTA [facility]   8/19/2021 11:15 AM Kamran Bal [facility]   8/24/2021 11:30 AM Lucrezia Alessia, PTA [facility]   8/26/2021 11:15 AM Kamran Bal [facility]

## 2021-08-03 NOTE — PROGRESS NOTES
[facility] PHYSICAL THERAPY  [address], [place], [address] - Phone: [phone]  Fax: [phone]  PROGRESS NOTE  Patient Name: Tana Cruz : 1995   Treatment/Medical Diagnosis: Right knee pain [M25.561]   Referral Source: [place], [place]     Date of Initial Visit: 21 Attended Visits: 5 Missed Visits: 0     SUMMARY OF TREATMENT  Pt is a 26year old M referred for tx of R knee pain. Treatment has consisted of the following: Therapeutic exercise, Therapeutic activities, Neuromuscular re-education, Physical agent/modality, Gait/balance training, Manual therapy, Patient education, Self Care training, Functional mobility training, Home safety training and Stair training. CURRENT STATUS  Pt reports 50 % overall improvement in sx since beginning care. Pt reports 10/10 max pain, 2-3/10 avg pain, lowest 0/10. Pain made worse with going down steps (utilizes a modified pattern favoring RLE), and putting weight on it. He continues to use DOTTIE axillary crutches and is limited in all  Functional mobility ( walking, transfers, stair navigation). 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 He does demonstrate improvements in ROM, and strength, although progress is slow at this point due to limited number of visits in clinic. Skilled care is warranted to continue in order to return pt to [facility] unless otherwise indicated by MD.     Improvements: less pain, better range of motion, getting out of chairs  Remaining functional limitations: unable to navigate stairs, walk, uses LLE primarily for transfers, unable to roll over in bed     Objective Measurements:   FOTO: 30/100  POSTURE/OBSERVATION:  R knee visualized with minimal edema noted to joint line and infrapatellar region. Pt maintains PWB on RLE in standing, majority of weight on LLE. Limited wb'ing through RLE with STS and standing.    FUNCTIONAL ASSESSMENT: Pt ambulating with b/l axillary crutches, slight flexed fwd posture, PWB RLE. Stairs NT today, pt reports continued modified pattern and most difficulty with descending   ROM KNEE:  R 0 to 122/130 (end range pain);   STRENGTH  Hip flex 4-/5 (p!), knee ext/flex 4/5 (p!). QS R Poor+  SPECIAL TESTS: n/a; Deferred ligamentous testing 2/2 apprehension/pain irritability. ADDITIONAL FINDINGS: Patellar glides NT secondary to pt intolerance of manual therapy. Goal/Measure of Progress Goal Met? 1. Pt will be educated in appropriate HEP to decrease pain, increase ROM, increase strength and return pt to PLOF. Status at last Eval: Issued and reviewed Current Status: I c current HEP, needs progression yes, needs progression   2.  . Pt will increase R knee AROM 0 to 120 in order to increase ease with ADL's and transfers. Status at last Eval: R -5 to 96/113 Current Status: R 0 to 122/130 yes   3. Pt will demonstrate Good isometric QS for improved stability with walking. Status at last Eval: Poor Current Status: Poor+ no, improving     Goal/Measure of Progress Goal Met? 4. Pt will report at least 25% functional improvement with sleeping/rolling in bed   Status at last Eval: na Current Status: 5% no, improving     Goal/Measure of Progress Goal Met? 1. Pt will improve FOTO score to >/= 66 to demo a significant improvement in functional activity tolerance   Status at last Eval: 23/100 Current Status: 30/100 no, improving   2. Pt will demonstrate R knee extension strength 5/5 to promote ability to ambulate reciprocally up/down flight of stairs. Status at last Eval: 4-/5p! Current Status: 4/5p! no, improving   3. Pt will be able to SLS R >/= 30\" on firm surface to promote ability to ambulate community distances without AD.    Status at last Eval: unable Current Status: Unable to fully WB on RLE no       New Goals to be achieved in __8__  treatments:  1.  Pt will be educated in appropriate long term HEP to decrease pain, increase ROM, increase strength and return pt to PLOF. 2. Pt will demonstrate Good isometric QS for improved stability with walking  3. Pt will report at least 25% functional improvement with sleeping/rolling in bed    1. Pt will improve FOTO score to >/= 66 to demo a significant improvement in functional activity tolerance. 2. Pt will demonstrate R knee extension strength 5/5 to promote ability to ambulate reciprocally up/down flight of stairs. 3. Pt will be able to SLS R >/= 30\" on firm surface to promote ability to ambulate community distances without AD. RECOMMENDATIONS  Continue with skilled care for 1-2 visits per week for 4 weeks pending MD agreement     If you have any questions/comments please contact us directly at [phone]. Thank you for allowing us to assist in the care of your patient. Therapist Signature: Flako Casas Date: 8/3/2021     Time: 1:58 PM   NOTE TO PHYSICIAN:  PLEASE COMPLETE THE ORDERS BELOW AND FAX TO   [facility]: [phone]  If you are unable to process this request in 24 hours please contact our office: [phone]    ___ I have read the above report and request that my patient continue as recommended.   ___ I have read the above report and request that my patient continue therapy with the following changes/special instructions:_________________________________________________________   ___ I have read the above report and request that my patient be discharged from therapy.      Physician Signature:        Date:       Time:       Mariajose Zhong

## 2021-08-04 ENCOUNTER — OFFICE VISIT (OUTPATIENT)
Dept: ORTHOPEDIC SURGERY | Age: 26
End: 2021-08-04
Payer: COMMERCIAL

## 2021-08-04 VITALS
RESPIRATION RATE: 15 BRPM | HEART RATE: 66 BPM | OXYGEN SATURATION: 98 % | BODY MASS INDEX: 37.93 KG/M2 | HEIGHT: 72 IN | WEIGHT: 280 LBS

## 2021-08-04 DIAGNOSIS — S83.91XA SPRAIN OF RIGHT KNEE, UNSPECIFIED LIGAMENT, INITIAL ENCOUNTER: Primary | ICD-10-CM

## 2021-08-04 PROCEDURE — 99214 OFFICE O/P EST MOD 30 MIN: CPT | Performed by: PHYSICIAN ASSISTANT

## 2021-08-04 RX ORDER — CELECOXIB 200 MG/1
200 CAPSULE ORAL 2 TIMES DAILY
Qty: 28 CAPSULE | Refills: 0 | Status: CANCELLED | OUTPATIENT
Start: 2021-08-04 | End: 2021-11-02

## 2021-08-04 RX ORDER — ETODOLAC 400 MG/1
400 TABLET, FILM COATED ORAL 2 TIMES DAILY
Qty: 60 TABLET | Refills: 1 | Status: SHIPPED | OUTPATIENT
Start: 2021-08-04

## 2021-08-04 NOTE — LETTER
NOTIFICATION RETURN TO WORK / SCHOOL    8/4/2021 2:05 PM    Mr. Asim Ingram  [address] [place] [address]      To Whom It May Concern:    Asim Ingram is currently under the care of Reji He. He will return to work in 4 weeks. If there are questions or concerns please have the patient contact our office.         Sincerely,      ROCKY Jernigan

## 2021-08-04 NOTE — PROGRESS NOTES
Surya Reagan  1995   Chief Complaint   Patient presents with    Knee Pain     right knee pain        HISTORY OF PRESENT ILLNESS  Surya Reagan is a 26 y.o. male who presents today for reevaluation of right knee pain. Patient rates pain as 2/10 today. Patient was swinging a baseball bat  on 7/05/2021 when he felt a pop in his knee. [facility] who [facility] him an Ace wrap and crutches and naproxen. Presents today ambulating with crutches. He states he can bare about 50% of his weight but does not feel strong enough to walk without crutches. Has been to PT which has been helping. Patient denies any fever, chills, chest pain, shortness of breath or calf pain. The remainder of the review of systems is negative. There are no new illness or injuries to report since last seen in the office. There are no changes to medications, allergies, family or social history. Pain Assessment  8/4/2021   Location of Pain Knee   Location Modifiers Right   Severity of Pain 2   Quality of Pain Aching   Duration of Pain Persistent   Frequency of Pain Intermittent   Date Pain First Started -   Aggravating Factors Other (Comment); Stairs   Aggravating Factors Comment pressure   Limiting Behavior Yes   Relieving Factors Exercises; Ice   Result of Injury Yes   Work-Related Injury No   Type of Injury (No Data)   Type of Injury Comment sports injury       PHYSICAL EXAM:   Visit Vitals  Pulse 66   Resp 15   Ht 6' (1.829 m)   Wt 280 lb (127 kg)   SpO2 98%   BMI 37.97 kg/m²     The patient is a well-developed, well-nourished male   in no acute distress. The patient is alert and oriented times three. The patient is alert and oriented times three. Mood and affect are normal.  LYMPHATIC: lymph nodes are not enlarged and are within normal limits  SKIN: normal in color and non tender to palpation. There are no bruises or abrasions noted. NEUROLOGICAL: Motor sensory exam is within normal limits.  Reflexes are equal bilaterally. There is normal sensation to pinprick and light touch  MUSCULOSKELETAL:  Examination Right knee   Skin Intact   Range of motion 0-115   Effusion -   Medial joint line tenderness +   Lateral joint line tenderness -   Tenderness Pes Bursa -   Tenderness insertion MCL -   Tenderness insertion LCL -   Sabras -   Patella crepitus -   Patella grind -   Lachman -   Pivot shift -   Anterior drawer -   Posterior drawer -   Varus stress -   Valgus stress -   Neurovascular Intact   Calf Swelling and Tenderness to Palpation -   Radha's Test -   Hamstring Cord Tightness -            IMAGING: MRI of right knee dated 7/08/2021 was reviewed and read by Dr. Ana Canales:   IMPRESSION:  1. No ACL tear. No patellar tendon tear. 2. Grade 2-3 chondral injury in the lateral patellar facet. 3. Lateralized patellar tendon and mild edema in superolateral Hoffa's fat pad which can be seen with patellar maltracking and impingement. There is also lateral patellar glide. 4. Mild contusion in the lateral tibial plateau. No fracture. 5. Lateral meniscus anterior horn and root intrasubstance increased signal with minimal extension to the peripheral upper surface suggesting small incomplete vertical tear. 4 [facility] read and reviewed by myself reveal no acute abnormalities      IMPRESSION:      ICD-10-CM ICD-9-CM    1. Sprain of right knee, unspecified ligament, initial encounter  S83. 91XA 844.9 REFERRAL TO PHYSICAL THERAPY      etodolac (LODINE) 400 mg tablet        PLAN:   1. Pt presents today with right knee pain due to an MRI-documented knee sprain. He has improved since the last OV. I am hopeful he can start walking on his own in the next couple weeks. Continue with PT. I would like to see him back in 4 weeks. Work note provided to keep him out of work for 4 weeks. 2. No ultrasound exam indicated today  3. No cortisone injection indicated today   4.  Yes Physical/Occupational Therapy indicated today  5. No diagnostic test indicated today   6. No durable medical equipment indicated today  7. No referral indicated today   8. Yes medications indicated today: Lodine  9.  No Narcotic indicated today       RTC 4 weeks      Scribed by Martha Horn[address]) as dictated by SHAWN Meadows PA-C [facility] [phone] [facility]

## 2021-08-05 ENCOUNTER — HOSPITAL ENCOUNTER (OUTPATIENT)
Dept: PHYSICAL THERAPY | Age: 26
Discharge: HOME | End: 2021-08-05
Payer: COMMERCIAL

## 2021-08-05 PROCEDURE — 97110 THERAPEUTIC EXERCISES: CPT

## 2021-08-05 PROCEDURE — 97014 ELECTRIC STIMULATION THERAPY: CPT

## 2021-08-05 NOTE — PROGRESS NOTES
PHYSICAL THERAPY - DAILY TREATMENT NOTE    Patient Name: Tresa Dominique        Date: 2021  : 1995   yes Patient  Verified  Visit #:     Insurance: Payor: Dago Matthews / Plan: [address] [place] [address] / Product Type: PPO /      In time: 12:04 PM Out time: 12:40   Total Treatment Time: 36     Medicare/[facility] Time Tracking (below)   Total Timed Codes (min):  18 1:1 Treatment Time:  18     TREATMENT AREA =  Right knee pain [M25.561]    SUBJECTIVE  Pain Level (on 0 to 10 scale):  0  / 10   Medication Changes/New allergies or changes in medical history, any new surgeries or procedures?    no  If yes, update Summary List   Subjective Functional Status/Changes:  []  No changes reported   Pt reports ortho PA advised him to continue with PT for another 4 weeks; will see PA again in 3 weeks. Pt to remain out of work for another 4 weeks       OBJECTIVE    Modalities Rationale:     decrease inflammation, decrease pain and increase muscle contraction/control to improve patient's ability to perform functional mobility/amb  8 min [x] Estim, type/location: [facility], 10/10\" to R VMO/RF with concurrent SAQ                                     []  att     [x]  unatt     []  w/US     []  w/ice    []  w/heat    min []  Mechanical Traction: type/lbs                   []  pro   []  sup   []  int   []  cont    []  before manual    []  after manual    min []  Ultrasound, settings/location:      min []  Iontophoresis w/ dexamethasone, location:                                               []  take home patch       []  in clinic    min []  Ice     []  Heat    location/position:    10 NC min [x]  Vasopneumatic Device, press/temp: To R knee in long sit per pt request, NC per insurance restrictions.   LT/MP   If using vaso (only need to measure limb vaso being performed on)      pre-treatment girth :       post-treatment girth :       measured at (landmark location) :      min []  Other:    [x] Skin assessment post-treatment (if applicable):    [x]  intact    [x]  redness- no adverse reaction                  []redness  adverse reaction:      18 min Therapeutic Exercise:  [x]  See flow sheet   Rationale:      increase ROM and increase strength to improve the patients ability to perform functional mobility/ambulation     TC min Manual Therapy: Patellar mobs grade 2-3. Knee flex/ext stretching in supine. Rationale:      decrease pain, increase ROM and increase tissue extensibility to improve patient's ability to perform functional mobility/ambulation  The manual therapy interventions were performed at a separate and distinct time from the therapeutic activities interventions. Billed With/As:   [x] TE   [] TA   [] Neuro   [] Self Care Patient Education: [x] Review HEP    [] Progressed/Changed HEP based on:   [] positioning   [] body mechanics   [] transfers   [] heat/ice application    [] other:      Other Objective/Functional Measures:   TC for tx 2/2 patient arrived 19 min late for session    -added prone ham curl; cues to pause for TKE stretch during ex  -added 4 lb to SLR abd/add/ext; 2 lb to flexion with cues for full ROM   Post Treatment Pain Level (on 0 to 10) scale:   0  / 10     ASSESSMENT  Assessment/Changes in Function:   Pt able demonstrates increased LE strength as per ability to advance with PRE today. Unfortunately, unable to perform standing exercises 2/2 time constraint but will plan to continue emphasis on progressive strengthening especially with wb'ing to promote ability to progress with independent ambulation. Pt continues to be PWB ~50-75% on RLE with ambulation in clinic today.        []  See Progress Note/Recertification   Patient will continue to benefit from skilled PT services to modify and progress therapeutic interventions, address functional mobility deficits, address ROM deficits, address strength deficits, analyze and address soft tissue restrictions, analyze and cue movement patterns, analyze and modify body mechanics/ergonomics, assess and modify postural abnormalities, address imbalance/dizziness and instruct in home and community integration to attain remaining goals. Progress toward goals / Updated goals:  PN completed last session; no significant changes yet. New Goals to be achieved in __8__  treatments:  1.  Pt will be educated in appropriate long term HEP to decrease pain, increase ROM, increase strength and return pt to PLOF. 2. Pt will demonstrate Good isometric QS for improved stability with walking  3. Pt will report at least 25% functional improvement with sleeping/rolling in bed     1. Pt will improve FOTO score to >/= 66 to demo a significant improvement in functional activity tolerance. 2. Pt will demonstrate R knee extension strength 5/5 to promote ability to ambulate reciprocally up/down flight of stairs. 3. Pt will be able to SLS R >/= 30\" on firm surface to promote ability to ambulate community distances without AD. PLAN  []  Upgrade activities as tolerated yes Continue plan of care   []  Discharge due to :    []  Other:      Therapist: Madison Virk.  Efrem Smith PT    Date: 8/5/2021 Time: 12:42 PM      Future Appointments   Date Time Provider Chris Gustafson   8/5/2021 11:45 AM Dipesh Khan PT [facility]   8/10/2021 11:30 AM Bud Chamber, PTA [facility]   8/12/2021 10:45 AM Bud Chamber, PTA [facility]   8/17/2021 10:45 AM Bud Chamber, PTA [facility]   8/19/2021 11:15 AM Amy Anusha [facility]   8/24/2021 11:30 AM Bud Chamber, PTA [facility]   8/25/2021  1:30 PM ROCKY Sweeney VS BS AMB   8/26/2021 11:15 AM Amy Anusha [facility]

## 2021-08-10 ENCOUNTER — HOSPITAL ENCOUNTER (OUTPATIENT)
Dept: PHYSICAL THERAPY | Age: 26
Discharge: HOME | End: 2021-08-10
Payer: COMMERCIAL

## 2021-08-10 PROCEDURE — 97110 THERAPEUTIC EXERCISES: CPT

## 2021-08-10 PROCEDURE — 97116 GAIT TRAINING THERAPY: CPT

## 2021-08-10 PROCEDURE — 97014 ELECTRIC STIMULATION THERAPY: CPT

## 2021-08-10 NOTE — PROGRESS NOTES
PHYSICAL THERAPY - DAILY TREATMENT NOTE    Patient Name: Don Bee        Date: 8/10/2021  : 1995   yes Patient  Verified  Visit #:     Insurance: Payor: Jamal Costa / Plan: [address] [place] [address] / Product Type: PPO /      In time: 11:26 Out time: 12:30   Total Treatment Time: 64     Medicare/[employer] Time Tracking (below)   Total Timed Codes (min):  46 1:1 Treatment Time:  46     TREATMENT AREA =  Right knee pain [M25.561]    SUBJECTIVE  Pain Level (on 0 to 10 scale):  0  / 10   Medication Changes/New allergies or changes in medical history, any new surgeries or procedures?    no  If yes, update Summary List   Subjective Functional Status/Changes:  []  No changes reported   I still like my leg is weak, but it has been getting better since I started the therapy.            OBJECTIVE  Modalities Rationale:     increase muscle contraction/control to improve patient's ability to improve quad strength/endurance   8  min [x] Estim, type/location: [facility], 10/10\" to R VMO/RF with concurrent SAQ                                        []  att     []  unatt     []  w/US     []  w/ice    []  w/heat    min []  Mechanical Traction: type/lbs                   []  pro   []  sup   []  int   []  cont    []  before manual    []  after manual    min []  Ultrasound, settings/location:      min []  Iontophoresis w/ dexamethasone, location:                                               []  take home patch       []  in clinic   10 min [x]  Ice     []  Heat    location/position: R knee/long sitting     min []  Vasopneumatic Device, press/temp:    If using vaso (only need to measure limb vaso being performed on)      pre-treatment girth :       post-treatment girth :       measured at (landmark location) :      min []  Other:    [] Skin assessment post-treatment (if applicable):    []  intact    []  redness- no adverse reaction                  []redness  adverse reaction:      38 min Therapeutic Exercise: [x]  See flow sheet   Rationale:       increase ROM and increase strength to improve the patients ability to perform functional mobility/ambulation     8 min Gait Training:  75 x 2 feet with 1 crutch gait on level surfaces with close supervision/cueing level of assistance and gian step overs with 6\" hurdles with 1 crutch and counter             Rationale: To improve ambulation safety and efficiency in order to improve patient's ability to safely ambulate at home for self care. Billed With/As:   [] TE   [] TA   [] Neuro   [] Self Care Patient Education: [x] Review HEP    [] Progressed/Changed HEP based on:   [] positioning   [] body mechanics   [] transfers   [] heat/ice application    [] other:      Other Objective/Functional Measures:       Post Treatment Pain Level (on 0 to 10) scale:   2-3  / 10     ASSESSMENT  Assessment/Changes in Function:   Pt reports the most difficulty with ascending/descending stairs in which he live in room over garage. Pt was able to advance to 1 crutch gait and gian step overs with 6\" hurdles with good form/tolerance today. Pt however was not as confident with weight bearing through involved LE with stepping over with L LE. Pt needs the most focus on quad strength and endurance. Will continue to progress/advance patient within current POC as tolerated with monitoring symptoms. []  See Progress Note/Recertification   Patient will continue to benefit from skilled PT services to modify and progress therapeutic interventions, address functional mobility deficits, address ROM deficits, address strength deficits, analyze and address soft tissue restrictions, analyze and cue movement patterns and instruct in home and community integration to attain remaining goals. Progress toward goals / Updated goals:  New Goals to be achieved in __8__  treatments:  1.  Pt will be educated in appropriate long term HEP to decrease pain, increase ROM, increase strength and return pt to PLOF.    2. Pt will demonstrate Good isometric QS for improved stability with walking  3. Pt will report at least 25% functional improvement with sleeping/rolling in bed: 8/10/21: Met, pt reports approximately 75% functional improvement with sleeping/rolling in bed     1. Pt will improve FOTO score to >/= 66 to demo a significant improvement in functional activity tolerance. 2. Pt will demonstrate R knee extension strength 5/5 to promote ability to ambulate reciprocally up/down flight of stairs. 3. Pt will be able to SLS R >/= 30\" on firm surface to promote ability to ambulate community distances without AD.      PLAN  [x]  Upgrade activities as tolerated yes Continue plan of care   []  Discharge due to :    []  Other:      Therapist: Kavya Jorge PTA    Date: 8/10/2021 Time: 11:45 AM     Future Appointments   Date Time Provider Chris Gustafson   8/12/2021 10:45 AM Kurt Babcock PTA [facility]   8/17/2021 10:45 AM Kurt Babcock PTA [facility]   8/19/2021 11:15 AM Jorge Alberto Meneses [facility]   8/24/2021 11:30 AM Kurt Babcock PTA [facility]   8/25/2021  1:30 PM ROCKY Hess VS BS AMB   8/26/2021 11:15 AM Jorge Alberto KONG [facility]

## 2021-08-12 ENCOUNTER — HOSPITAL ENCOUNTER (OUTPATIENT)
Dept: PHYSICAL THERAPY | Age: 26
Discharge: HOME | End: 2021-08-12
Payer: COMMERCIAL

## 2021-08-12 PROCEDURE — 97110 THERAPEUTIC EXERCISES: CPT

## 2021-08-12 PROCEDURE — 97014 ELECTRIC STIMULATION THERAPY: CPT

## 2021-08-12 NOTE — PROGRESS NOTES
PHYSICAL THERAPY - DAILY TREATMENT NOTE    Patient Name: Tresa Dominique        Date: 2021  : 1995   yes Patient  Verified  Visit #:     Insurance: Payor: Dago Matthews / Plan: [address] [place] [address] / Product Type: PPO /      In time: 10:51 Out time: 12:01   Total Treatment Time: 70     Medicare/[facility] Time Tracking (below)   Total Timed Codes (min):  52 1:1 Treatment Time:  39     TREATMENT AREA =  Right knee pain [M25.561]    SUBJECTIVE  Pain Level (on 0 to 10 scale):  0  / 10   Medication Changes/New allergies or changes in medical history, any new surgeries or procedures?    no  If yes, update Summary List   Subjective Functional Status/Changes:  []  No changes reported   I have gone to using just the one crutch with walking around, but I still have to use 2 crutches when I go up and down the stairs.           OBJECTIVE  Modalities Rationale:     increase muscle contraction/control to improve patient's ability to improve quad strength/endurance   8 min [x] Estim, type/location: [facility], 10/10\" to R VMO/RF with concurrent SAQ                                      []  att     []  unatt     []  w/US     []  w/ice    []  w/heat    min []  Mechanical Traction: type/lbs                   []  pro   []  sup   []  int   []  cont    []  before manual    []  after manual    min []  Ultrasound, settings/location:      min []  Iontophoresis w/ dexamethasone, location:                                               []  take home patch       []  in clinic    min []  Ice     []  Heat    location/position:    10 N/C min []  Vasopneumatic Device, press/temp: Med/low   If using vaso (only need to measure limb vaso being performed on)      pre-treatment girth :       post-treatment girth :       measured at (landmark location) :      min []  Other:    [] Skin assessment post-treatment (if applicable):    []  intact    []  redness- no adverse reaction                  []redness  adverse reaction:      52 min Therapeutic Exercise:  [x]  See flow sheet   Rationale:       increase ROM and increase strength to improve the patients ability to perform functional mobility/ambulation       Billed With/As:   [] TE   [] TA   [] Neuro   [] Self Care Patient Education: [x] Review HEP    [] Progressed/Changed HEP based on:   [] positioning   [] body mechanics   [] transfers   [] heat/ice application    [] other:      Other Objective/Functional Measures:       Post Treatment Pain Level (on 0 to 10) scale:   0  / 10     ASSESSMENT  Assessment/Changes in Function:   Pt presents with the most functional improvement with the ability to decrease to 1 crutch ambulation all distances except for ascending/descending stairs since last treatment. However, pt presents with chief c/o knee instability with forward weight shifting. Will continue to progress/advance patient within current POC as tolerated with monitoring symptoms. []  See Progress Note/Recertification   Patient will continue to benefit from skilled PT services to modify and progress therapeutic interventions, address functional mobility deficits, address ROM deficits, address strength deficits, analyze and cue movement patterns and instruct in home and community integration to attain remaining goals. Progress toward goals / Updated goals:  New Goals to be achieved in __8__  treatments:  1.  Pt will be educated in appropriate long term HEP to decrease pain, increase ROM, increase strength and return pt to PLOF. 2. Pt will demonstrate Good isometric QS for improved stability with walking  3. Pt will report at least 25% functional improvement with sleeping/rolling in bed: 8/10/21: Met, pt reports approximately 75% functional improvement with sleeping/rolling in bed     1. Pt will improve FOTO score to >/= 66 to demo a significant improvement in functional activity tolerance.   2. Pt will demonstrate R knee extension strength 5/5 to promote ability to ambulate reciprocally up/down flight of stairs. 3. Pt will be able to SLS R >/= 30\" on firm surface to promote ability to ambulate community distances without AD.      PLAN  [x]  Upgrade activities as tolerated yes Continue plan of care   []  Discharge due to :    []  Other:      Therapist: Heber Cantu PTA    Date: 8/12/2021 Time: 11:13 AM     Future Appointments   Date Time Provider Chris Gustafson   8/17/2021 10:45 AM [facility] [address]   8/19/2021 11:15 AM Am[facility]avares [facility] [address]   8/24/2021 11:30 AM Bud Lopez [facility] [address]   8/25/2021  1:30 PM ROCKY [facility] AMB   8/26/2021 11:15 AM Amy [facility] [address]

## 2021-08-17 ENCOUNTER — HOSPITAL ENCOUNTER (OUTPATIENT)
Dept: PHYSICAL THERAPY | Age: 26
Discharge: HOME | End: 2021-08-17
Payer: COMMERCIAL

## 2021-08-17 PROCEDURE — 97014 ELECTRIC STIMULATION THERAPY: CPT

## 2021-08-17 PROCEDURE — 97110 THERAPEUTIC EXERCISES: CPT

## 2021-08-17 NOTE — PROGRESS NOTES
PHYSICAL THERAPY - DAILY TREATMENT NOTE    Patient Name: Juancarlos Corrales        Date: 2021  : 1995   yes Patient  Verified  Visit #:     Insurance: Payor: Yury Perez / Plan: [address] [place] [address] / Product Type: PPO /      In time: 10:45 Out time: 11:53   Total Treatment Time: 68     Medicare/[employer] Time Tracking (below)   Total Timed Codes (min):  50 1:1 Treatment Time:  50     TREATMENT AREA =  Right knee pain [M25.561]    SUBJECTIVE  Pain Level (on 0 to 10 scale):  0  / 10   Medication Changes/New allergies or changes in medical history, any new surgeries or procedures?    no  If yes, update Summary List   Subjective Functional Status/Changes:  []  No changes reported   I have been walking without the crutch since  and I feel pretty good with it especially since I figured out what  Rajwinder Mayen doing wrong with my knee feeling unstable with shifting my weight onto it.            OBJECTIVE  Modalities Rationale:      increase muscle contraction/control to improve patient's ability to improve quad strength/endurance   8 min [x] Estim, type/location: [facility], 10/10\" to R VMO/RF with concurrent SAQ                                        []  att     []  unatt     []  w/US     []  w/ice    []  w/heat    min []  Mechanical Traction: type/lbs                   []  pro   []  sup   []  int   []  cont    []  before manual    []  after manual    min []  Ultrasound, settings/location:      min []  Iontophoresis w/ dexamethasone, location:                                               []  take home patch       []  in clinic    min []  Ice     []  Heat    location/position:    10 min [x]  Vasopneumatic Device, press/temp: Med/low   If using vaso (only need to measure limb vaso being performed on)      pre-treatment girth :       post-treatment girth :       measured at (landmark location) :      min []  Other:    [] Skin assessment post-treatment (if applicable):    []  intact    []  redness- no adverse reaction                  []redness  adverse reaction:      50 min Therapeutic Exercise:  [x]  See flow sheet   Rationale:      increase ROM, increase strength, improve balance and increase proprioception to improve the patients ability to improve functional abilities        Billed With/As:   [] TE   [] TA   [] Neuro   [] Self Care Patient Education: [x] Review HEP    [] Progressed/Changed HEP based on:   [] positioning   [] body mechanics   [] transfers   [] heat/ice application    [] other:      Other Objective/Functional Measures:  addition of SLS on level surface as well as STAR taps on green oval     Post Treatment Pain Level (on 0 to 10) scale:   2-3  / 10     ASSESSMENT  Assessment/Changes in Function:   Pt has discontinued use of crutch over the past 3 days with good gait quality/technique noted with ambulation around clinic. Pt was also able to advance to addition of SLS on level surface as well as STAR taps on green oval with moderate challenge with LE endurance and proprioceptive/balance awareness today. Will continue to progress/advance patient within current POC as tolerated with monitoring symptoms. []  See Progress Note/Recertification   Patient will continue to benefit from skilled PT services to modify and progress therapeutic interventions, address functional mobility deficits, address ROM deficits, address strength deficits, analyze and cue movement patterns and instruct in home and community integration to attain remaining goals. Progress toward goals / Updated goals:  New Goals to be achieved in __8__  treatments:  1.  Pt will be educated in appropriate long term HEP to decrease pain, increase ROM, increase strength and return pt to PLOF. 2. Pt will demonstrate Good isometric QS for improved stability with walking  3.  Pt will report at least 25% functional improvement with sleeping/rolling in bed: 8/10/21: Met, pt reports approximately 75% functional improvement with sleeping/rolling in bed     1. Pt will improve FOTO score to >/= 66 to demo a significant improvement in functional activity tolerance. 2. Pt will demonstrate R knee extension strength 5/5 to promote ability to ambulate reciprocally up/down flight of stairs. 3. Pt will be able to SLS R >/= 30\" on firm surface to promote ability to ambulate community distances without AD.      PLAN  [x]  Upgrade activities as tolerated yes Continue plan of care   []  Discharge due to :    []  Other:      Therapist: Jazmyn Damian PTA    Date: 8/17/2021 Time: 11:00 AM     Future Appointments   Date Time Provider Chris Gustafson   8/19/2021 11:15 AM Tiffany KONG [facility]   8/24/2021 11:30 AM Leone Lombard, PTA [facility]   8/25/2021  1:30 PM Metta Opitz, PA [facility]   8/26/2021 11:15 AM Tiffany KONG [facility]

## 2021-08-19 ENCOUNTER — HOSPITAL ENCOUNTER (OUTPATIENT)
Dept: PHYSICAL THERAPY | Age: 26
Discharge: HOME | End: 2021-08-19
Payer: COMMERCIAL

## 2021-08-19 PROCEDURE — 97014 ELECTRIC STIMULATION THERAPY: CPT

## 2021-08-19 PROCEDURE — 97110 THERAPEUTIC EXERCISES: CPT

## 2021-08-19 NOTE — PROGRESS NOTES
PHYSICAL THERAPY - DAILY TREATMENT NOTE    Patient Name: Lazara Simpson        Date: 2021  : 1995   yes Patient  Verified  Visit #:   10     Insurance: Payor: Yesi Candelario / Plan: [address] [place] [address] / Product Type: PPO /      In time: 1120 Out time: 1226   Total Treatment Time: 66     Medicare/[employer] Time Tracking (below)   Total Timed Codes (min):  48 1:1 Treatment Time:  48     TREATMENT AREA =  Right knee pain [M25.561]    SUBJECTIVE  Pain Level (on 0 to 10 scale):  0  / 10   Medication Changes/New allergies or changes in medical history, any new surgeries or procedures?    no  If yes, update Summary List   Subjective Functional Status/Changes:  []  No changes reported   Pt reports he has been riding the bike at [employer] and it is going well         OBJECTIVE  Modalities Rationale:      increase muscle contraction/control to improve patient's ability to improve quad strength/endurance   8 min [x] Estim, type/location: [facility], 10/10\" to R VMO/RF with concurrent SAQ                                        []  att     []  unatt     []  w/US     []  w/ice    []  w/heat    min []  Mechanical Traction: type/lbs                   []  pro   []  sup   []  int   []  cont    []  before manual    []  after manual    min []  Ultrasound, settings/location:      min []  Iontophoresis w/ dexamethasone, location:                                               []  take home patch       []  in clinic    min []  Ice     []  Heat    location/position:    10 min [x]  Vasopneumatic Device, press/temp: Med/low   If using vaso (only need to measure limb vaso being performed on)      pre-treatment girth :       post-treatment girth :       measured at (landmark location) :      min []  Other:    [] Skin assessment post-treatment (if applicable):    []  intact    []  redness- no adverse reaction                  []redness  adverse reaction:      48 min Therapeutic Exercise:  [x]  See flow sheet Rationale:      increase ROM, increase strength, improve balance and increase proprioception to improve the patients ability to improve functional abilities        Billed With/As:   [] TE   [] TA   [] Neuro   [] Self Care Patient Education: [x] Review HEP    [] Progressed/Changed HEP based on:   [] positioning   [] body mechanics   [] transfers   [] heat/ice application    [] other:      Other Objective/Functional Measures: Added side steps  Progressed TKE, LAQ, and TG squats     Post Treatment Pain Level (on 0 to 10) scale:   0  / 10     ASSESSMENT  Assessment/Changes in Function:  Pt was able to continue with progressions from last visit. Pt was able to tolerate side steps in addition to typical programming requiring demo and vc to perform the exercise correctly. Pt was able to tolerate progressions with resistance as noted in flow sheet evidencing improving strength. []  See Progress Note/Recertification   Patient will continue to benefit from skilled PT services to modify and progress therapeutic interventions, address functional mobility deficits, address ROM deficits, address strength deficits, analyze and cue movement patterns and instruct in home and community integration to attain remaining goals. Progress toward goals / Updated goals:  New Goals to be achieved in __8__  treatments:  1.  Pt will be educated in appropriate long term HEP to decrease pain, increase ROM, increase strength and return pt to PLOF. 2. Pt will demonstrate Good isometric QS for improved stability with walking  3. Pt will report at least 25% functional improvement with sleeping/rolling in bed: 8/10/21: Met, pt reports approximately 75% functional improvement with sleeping/rolling in bed     1. Pt will improve FOTO score to >/= 66 to demo a significant improvement in functional activity tolerance. 2. Pt will demonstrate R knee extension strength 5/5 to promote ability to ambulate reciprocally up/down flight of stairs. Progressing with inc resistance on TG squats and LAQ 8/19/21  3. Pt will be able to SLS R >/= 30\" on firm surface to promote ability to ambulate community distances without AD.      PLAN  [x]  Upgrade activities as tolerated yes Continue plan of care   []  Discharge due to :    []  Other:      Therapist: Álvaro Doyle    Date: 8/19/2021 Time: 11:00 AM     Future Appointments   Date Time Provider Chris Gustafson   8/19/2021 11:15 AM Mamadou Alejandra [facility]   8/24/2021 11:30 AM Yaquelin Garcia PTA [facility]   8/25/2021  1:30 PM [facility]   8/26/2021 11:15 AM Mamadou Alejandra [facility]

## 2021-08-24 ENCOUNTER — HOSPITAL ENCOUNTER (OUTPATIENT)
Dept: PHYSICAL THERAPY | Age: 26
Discharge: HOME | End: 2021-08-24
Payer: COMMERCIAL

## 2021-08-24 PROCEDURE — 97110 THERAPEUTIC EXERCISES: CPT

## 2021-08-24 NOTE — PROGRESS NOTES
PHYSICAL THERAPY - DAILY TREATMENT NOTE    Patient Name: Oscar Johnson        Date: 2021  : 1995   yes Patient  Verified  Visit #:     Insurance: Payor: Cherelle Ochoa / Plan: [address] [place] [address] / Product Type: PPO /      In time: 11:36 Out time: 12:26   Total Treatment Time: 50     Medicare/[employer] Time Tracking (below)   Total Timed Codes (min): 40 1:1 Treatment Time: 40     TREATMENT AREA =  Right knee pain [M25.561]    SUBJECTIVE  Pain Level (on 0 to 10 scale):  0  / 10   Medication Changes/New allergies or changes in medical history, any new surgeries or procedures?    no  If yes, update Summary List   Subjective Functional Status/Changes:  []  No changes reported   My knee has been doing pretty food since I was here last and I have been doing more cardio at the gym with riding the bike for 30 minutes and it feels pretty good on my knee.           OBJECTIVE  Modalities Rationale:      increase muscle contraction/control to improve patient's ability to improve quad strength/endurance    min [] Estim, type/location:                                        []  att     []  unatt     []  w/US     []  w/ice    []  w/heat    min []  Mechanical Traction: type/lbs                   []  pro   []  sup   []  int   []  cont    []  before manual    []  after manual    min []  Ultrasound, settings/location:      min []  Iontophoresis w/ dexamethasone, location:                                               []  take home patch       []  in clinic    min []  Ice     []  Heat    location/position:    10 min [x]  Vasopneumatic Device, press/temp: Med/low   If using vaso (only need to measure limb vaso being performed on)      pre-treatment girth :       post-treatment girth :       measured at (landmark location) :      min []  Other:    [] Skin assessment post-treatment (if applicable):    []  intact    []  redness- no adverse reaction                  []redness  adverse reaction:      40 min Therapeutic Exercise:  [x]  See flow sheet   Rationale:      increase ROM, increase strength, improve balance and increase proprioception to improve the patients ability to improve functional abilities        Billed With/As:   [] TE   [] TA   [] Neuro   [] Self Care Patient Education: [x] Review HEP    [] Progressed/Changed HEP based on:   [] positioning   [] body mechanics   [] transfers   [] heat/ice application    [] other:      Other Objective/Functional Measures:  R knee flexion AROM= 115 degrees (L uninvolved= 114 degrees) measured in supine     Post Treatment Pain Level (on 0 to 10) scale:   0  / 10     ASSESSMENT  Assessment/Changes in Function:  Pt was able to advance to addition of step ups on 6\" step but had to regress to 2\" step with lateral tap downs due to mod to max challenge with eccentric quad control/endurance today. Pt was also able to advance to addition of foam with SLS with mod challenge with proprioceptive/balance awareness. Pt has met maximum medical benefit/potential from successive russian e-stim treatments which was discontinued today. Will continue to progress/advance patient within current POC as tolerated with monitoring symptoms. []  See Progress Note/Recertification   Patient will continue to benefit from skilled PT services to modify and progress therapeutic interventions, address functional mobility deficits, address ROM deficits, address strength deficits, analyze and cue movement patterns and instruct in home and community integration to attain remaining goals. Progress toward goals / Updated goals:  New Goals to be achieved in __8__  treatments:  1.  Pt will be educated in appropriate long term HEP to decrease pain, increase ROM, increase strength and return pt to PLOF. 2. Pt will demonstrate Good isometric QS for improved stability with walking 8/24/21: Met  3.  Pt will report at least 25% functional improvement with sleeping/rolling in bed: 8/10/21: Met, pt reports approximately 75% functional improvement with sleeping/rolling in bed     1. Pt will improve FOTO score to >/= 66 to demo a significant improvement in functional activity tolerance. 2. Pt will demonstrate R knee extension strength 5/5 to promote ability to ambulate reciprocally up/down flight of stairs. Progressing with inc resistance on TG squats and LAQ 8/19/21  3. Pt will be able to SLS R >/= 30\" on firm surface to promote ability to ambulate community distances without AD.      PLAN  [x]  Upgrade activities as tolerated yes Continue plan of care   []  Discharge due to :    []  Other:      Therapist: Marilee Garcia PTA    Date: 8/24/2021 Time: 11:00 AM     Future Appointments   Date Time Provider Chris Gustafson   8/25/2021  1:30 PM Vi Marques, [address] [facility]   8/26/2021 11:15 AM Darrell Clemente [facility]

## 2021-08-25 ENCOUNTER — OFFICE VISIT (OUTPATIENT)
Dept: ORTHOPEDIC SURGERY | Age: 26
End: 2021-08-25
Payer: COMMERCIAL

## 2021-08-25 VITALS — WEIGHT: 275 LBS | BODY MASS INDEX: 37.25 KG/M2 | HEIGHT: 72 IN

## 2021-08-25 DIAGNOSIS — S83.91XA SPRAIN OF RIGHT KNEE, UNSPECIFIED LIGAMENT, INITIAL ENCOUNTER: Primary | ICD-10-CM

## 2021-08-25 PROCEDURE — 99213 OFFICE O/P EST LOW 20 MIN: CPT | Performed by: PHYSICIAN ASSISTANT

## 2021-08-25 NOTE — PROGRESS NOTES
Julisa Aguero  1995   Chief Complaint   Patient presents with    Knee Pain     RIGHT        HISTORY OF PRESENT ILLNESS  Julisa Aguero is a 23 y.o. male who presents today for reevaluation of right knee pain. Patient rates pain as 0/10 today. Patient was swinging a baseball bat  on 7/05/2021 when he felt a pop in his knee. Still notes discomfort with stairs in the front of his knee. PT has been helping, and he would like to go to one more week of PT before returning to work. Patient denies any fever, chills, chest pain, shortness of breath or calf pain. The remainder of the review of systems is negative. There are no new illness or injuries to report since last seen in the office. There are no changes to medications, allergies, family or social history. Pain Assessment  8/25/2021   Location of Pain Knee   Location Modifiers Right   Severity of Pain 0   Quality of Pain -   Duration of Pain -   Frequency of Pain -   Date Pain First Started -   Aggravating Factors -   Aggravating Factors Comment -   Limiting Behavior -   Relieving Factors -   Result of Injury Yes   Work-Related Injury -   Type of Injury Other (Comment)   Type of Injury Comment -       PHYSICAL EXAM:   Visit Vitals  Ht 6' (1.829 m)   Wt 275 lb (124.7 kg)   BMI 37.30 kg/m²     The patient is a well-developed, well-nourished male   in no acute distress. The patient is alert and oriented times three. The patient is alert and oriented times three. Mood and affect are normal.  LYMPHATIC: lymph nodes are not enlarged and are within normal limits  SKIN: normal in color and non tender to palpation. There are no bruises or abrasions noted. NEUROLOGICAL: Motor sensory exam is within normal limits. Reflexes are equal bilaterally.  There is normal sensation to pinprick and light touch  MUSCULOSKELETAL:  Examination Right knee   Skin Intact   Range of motion 0-120   Effusion -   Medial joint line tenderness -   Lateral joint line tenderness - Tenderness Pes Bursa -   Tenderness insertion MCL -   Tenderness insertion LCL -   Sabras -   Patella crepitus -   Patella grind -   Lachman -   Pivot shift -   Anterior drawer -   Posterior drawer -   Varus stress -   Valgus stress -   Neurovascular Intact   Calf Swelling and Tenderness to Palpation -   Radha's Test -   Hamstring Cord Tightness +          IMAGING: MRI of right knee dated 7/08/2021 was reviewed and read by myself:  IMPRESSION:  1. No ACL tear. No patellar tendon tear. 2. Grade 2-3 chondral injury in the lateral patellar facet. 3. Lateralized patellar tendon and mild edema in superolateral Hoffa's fat pad which can be seen with patellar maltracking and impingement. There is also lateral patellar glide. 4. Mild contusion in the lateral tibial plateau. No fracture. 5. Lateral meniscus anterior horn and root intrasubstance increased signal with minimal extension to the peripheral upper surface suggesting small incomplete vertical tear. 4 view xray taken at patient first read and reviewed by myself reveal no acute abnormalities      IMPRESSION:      ICD-10-CM ICD-9-CM    1. Sprain of right knee, unspecified ligament, initial encounter  S83. 91XA 844.9         PLAN:   1. Pt presents today with right knee pain due to an MRI-documented knee sprain. Pt is doing well today and has improved since last OV. Continue with hamstring stretches and PT as needed. Return as needed. Risk factors: BMI>37, htn  2. No ultrasound exam indicated today  3. No cortisone injection indicated today   4. No Physical/Occupational Therapy indicated today  5. No diagnostic test indicated today   6. No durable medical equipment indicated today  7. No referral indicated today   8. No medications indicated today  9.  No Narcotic indicated today       RTC prn      Scribed by Marko Horn[address]) as dictated by SHAWN Hudson PA-C [facility] [phone] [facility]

## 2021-08-26 ENCOUNTER — HOSPITAL ENCOUNTER (OUTPATIENT)
Dept: PHYSICAL THERAPY | Age: 26
Discharge: HOME | End: 2021-08-26
Payer: COMMERCIAL

## 2021-08-26 PROCEDURE — 97110 THERAPEUTIC EXERCISES: CPT

## 2021-08-26 NOTE — PROGRESS NOTES
PHYSICAL THERAPY - DAILY TREATMENT NOTE    Patient Name: Jude Devine        Date: 2021  : 1995   yes Patient  Verified  Visit #:   12     Insurance: Payor: Adama Estrella / Plan: [address] [place] [address] / Product Type: PPO /      In time: 11:18 Out time: 12:14   Total Treatment Time: 56     Medicare/BCBS Time Tracking (below)   Total Timed Codes (min): 46 1:1 Treatment Time: 46     TREATMENT AREA =  Right knee pain [M25.561]    SUBJECTIVE  Pain Level (on 0 to 10 scale):  0  / 10   Medication Changes/New allergies or changes in medical history, any new surgeries or procedures?    no  If yes, update Summary List   Subjective Functional Status/Changes:  []  No changes reported   Pt reports he has been doing well, the knee is not as painful as it was, he still has pain going down stairs         OBJECTIVE  Modalities Rationale:      increase muscle contraction/control to improve patient's ability to improve quad strength/endurance    min [] Estim, type/location:                                        []  att     []  unatt     []  w/US     []  w/ice    []  w/heat    min []  Mechanical Traction: type/lbs                   []  pro   []  sup   []  int   []  cont    []  before manual    []  after manual    min []  Ultrasound, settings/location:      min []  Iontophoresis w/ dexamethasone, location:                                               []  take home patch       []  in clinic    min []  Ice     []  Heat    location/position:    10  min [x]  Vasopneumatic Device, press/temp: Med/low   If using vaso (only need to measure limb vaso being performed on)      pre-treatment girth :       post-treatment girth :       measured at (landmark location) :      min []  Other:    [] Skin assessment post-treatment (if applicable):    []  intact    []  redness- no adverse reaction                  []redness  adverse reaction:      46 min Therapeutic Exercise:  [x]  See flow sheet   Rationale:      increase ROM, increase strength, improve balance and increase proprioception to improve the patients ability to improve functional abilities        Billed With/As:   [] TE   [] TA   [] Neuro   [] Self Care Patient Education: [x] Review HEP    [] Progressed/Changed HEP based on:   [] positioning   [] body mechanics   [] transfers   [] heat/ice application    [] other:      Other Objective/Functional Measures:  See PN     Post Treatment Pain Level (on 0 to 10) scale:   3/ 10     ASSESSMENT  Assessment/Changes in Function:  Pt was able to tolerate wall squats and standing heel raises today vs total gym evidencing improving strength. Wall squat depth was modified to pt tolerance with intention of progressing to box lifts for work duties and ADLs. []  See Progress Note/Recertification   Patient will continue to benefit from skilled PT services to modify and progress therapeutic interventions, address functional mobility deficits, address ROM deficits, address strength deficits, analyze and cue movement patterns and instruct in home and community integration to attain remaining goals.    Progress toward goals / Updated goals:  See pn     PLAN  [x]  Upgrade activities as tolerated yes Continue plan of care   []  Discharge due to :    []  Other:      Therapist: Orion Pisano    Date: 8/26/2021 Time: 11:00 AM     Future Appointments   Date Time Provider Chris Gustafson   8/26/2021 11:15 AM Jorge Alberto Meneses [facility]

## 2021-08-26 NOTE — PROGRESS NOTES
[facility] PHYSICAL THERAPY  [address], [place], [address] - Phone: [phone]  Fax: [phone]  PROGRESS NOTE  Patient Name: Jude Devine : 1995   Treatment/Medical Diagnosis: Right knee pain [M25.561]   Referral Source: Alexandra Hernandez, [address] [place] [address] [place]     Date of Initial Visit: 21 Attended Visits: 12 Missed Visits: 0     SUMMARY OF TREATMENT  Pt is a 26year old M referred for tx of R knee pain. Treatment has consisted of the following: Therapeutic exercise, Therapeutic activities, Neuromuscular re-education, Physical agent/modality, Gait/balance training, Manual therapy, Patient education, Self Care training, Functional mobility training, Home safety training and Stair training. CURRENT STATUS  Pt reports 80% overall improvement in sx since beginning care. Pt reports 3-4/10 max pain, 2-3/10 avg pain, lowest 0/10. Pt self reported improvements are also reflected in improved FOTO score. 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 Skilled care is warranted to continue in order to address strength deficits to return pt functional mobility to [facility]. Improvements: less pain, better ability to walk and go up stairs,   Remaining functional limitations: unable to descend stairs, unable to fully squat to lift     Objective Measurements:   FOTO: 69/100  POSTURE/OBSERVATION: DOTTIE full WB, minimal swelling to R knee  FUNCTIONAL ASSESSMENT: Functional squat- pt apprehensive, able to squat through 25%ROM, reports pain with return to standing. Able to ascend stairs reciprocally, modified pattern to descend. ROM KNEE:  R knee flexion AROM= 115 degrees (L uninvolved= 114 degrees) measured in supine  STRENGTH  Hip flex 4/5 , knee ext/flex 4+/5 (p!).    QS R good  SPECIAL TESTS: n/a; Deferred ligamentous testing 2/2 apprehension/pain irritability. ADDITIONAL FINDINGS: Patellar glides NT secondary to pt intolerance of manual therapy. Goal/Measure of Progress Goal Met? 1. Pt will be educated in appropriate HEP to decrease pain, increase ROM, increase strength and return pt to PLOF. Status at last Eval: I c current HEP, needs progression Current Status: I c current HEP, needs progression yes, needs progression              2.   Pt will demonstrate Good isometric QS for improved stability with walking. Status at last Eval: Poor+ Current Status: Good yes     Goal/Measure of Progress Goal Met? 4. Pt will report at least 25% functional improvement with sleeping/rolling in bed   Status at last Eval: 5% Current Status: 100% yes     Goal/Measure of Progress Goal Met? 1. Pt will improve FOTO score to >/= 66 to demo a significant improvement in functional activity tolerance   Status at last Eval: 30/100 Current Status: 69/100 Yes   2. Pt will demonstrate R knee extension strength 5/5 to promote ability to ambulate reciprocally up/down flight of stairs. Status at last Eval: 4/5p! Current Status: 4+/5p! no, improving   3. Pt will be able to SLS R >/= 30\" on firm surface to promote ability to ambulate community distances without AD. Status at last Eval: Unable to fully WB on RLE Current Status: 30\" on floor yes     New Goals to be achieved in __6__  treatments:  1.  Pt will be educated in appropriate long term HEP to decrease pain, increase ROM, increase strength and return pt to PLOF. 2. Pt will demonstrate R knee extension strength 5/5 to promote ability to ambulate reciprocally up/down flight of stairs.   3. Pt will be able to squat to lift 20# from floor for ADLs    RECOMMENDATIONS  Continue with skilled care for 1-2 visits per week for 2 weeks then 1x per week for 2 weeks for 6 visits total to transition pt back to functional strength for work and gym activities    If you have any questions/comments please contact us directly at [phone]. Thank you for allowing us to assist in the care of your patient. Therapist Signature: Saman Moe Date: 8/26/2021     Time: 1:58 PM   NOTE TO PHYSICIAN:  PLEASE COMPLETE THE ORDERS BELOW AND FAX TO   [facility]: [phone]  If you are unable to process this request in 24 hours please contact our office: [phone]    ___ I have read the above report and request that my patient continue as recommended.   ___ I have read the above report and request that my patient continue therapy with the following changes/special instructions:_________________________________________________________   ___ I have read the above report and request that my patient be discharged from therapy.      Physician Signature:        Date:       Time:       Mariajose Ramirez

## 2021-08-30 ENCOUNTER — HOSPITAL ENCOUNTER (OUTPATIENT)
Dept: PHYSICAL THERAPY | Age: 26
Discharge: HOME | End: 2021-08-30
Payer: COMMERCIAL

## 2021-08-30 PROCEDURE — 97110 THERAPEUTIC EXERCISES: CPT

## 2021-08-30 NOTE — PROGRESS NOTES
PHYSICAL THERAPY - DAILY TREATMENT NOTE    Patient Name: Estefany Lopez        Date: 2021  : 1995   yes Patient  Verified  Visit #:     Insurance: Payor: Ondina Clayton / Plan: [address] [place] [address] / Product Type: PPO /      In time: 1:05 Out time: 2:07   Total Treatment Time: 62     Medicare/BCBS Time Tracking (below)   Total Timed Codes (min):  52 1:1 Treatment Time:  41     TREATMENT AREA =  Right knee pain [M25.561]    SUBJECTIVE  Pain Level (on 0 to 10 scale):  0  / 10   Medication Changes/New allergies or changes in medical history, any new surgeries or procedures?    no  If yes, update Summary List   Subjective Functional Status/Changes:  []  No changes reported   My knee has been feeling pretty good, I haven't really had any issues at all since I was here last and I have been riding the bike at the gym for 30 minutes          OBJECTIVE  Modalities Rationale:     decrease inflammation and decrease pain to improve patient's ability to improve functional abilities    min [] Estim, type/location:                                      []  att     []  unatt     []  w/US     []  w/ice    []  w/heat    min []  Mechanical Traction: type/lbs                   []  pro   []  sup   []  int   []  cont    []  before manual    []  after manual    min []  Ultrasound, settings/location:      min []  Iontophoresis w/ dexamethasone, location:                                               []  take home patch       []  in clinic    min []  Ice     []  Heat    location/position:    10 min []  Vasopneumatic Device, press/temp: Med/low   If using vaso (only need to measure limb vaso being performed on)      pre-treatment girth :       post-treatment girth :       measured at (landmark location) :      min []  Other:    [] Skin assessment post-treatment (if applicable):    []  intact    []  redness- no adverse reaction                  []redness  adverse reaction:      52 min Therapeutic Exercise:  [x] See flow sheet   Rationale:      increase ROM, increase strength, improve balance and increase proprioception to improve the patients ability to improve functional abilities        Billed With/As:   [] TE   [] TA   [] Neuro   [] Self Care Patient Education: [x] Review HEP    [] Progressed/Changed HEP based on:   [] positioning   [] body mechanics   [] transfers   [] heat/ice application    [] other:      Other Objective/Functional Measures:  addition of Lateral X      Post Treatment Pain Level (on 0 to 10) scale:   0  / 10     ASSESSMENT  Assessment/Changes in Function:   Pt presents with no increase in knee pain/symptoms since last treatment and was able to advance to addition of Lateral X as well as increased resistance with mini band side steps with moderate challenge today. Pt is still challenged with strength and endurance with current program especially with standing exercises. Will continue to progress/advance patient within current POC as tolerated with monitoring symptoms. []  See Progress Note/Recertification   Patient will continue to benefit from skilled PT services to modify and progress therapeutic interventions, address functional mobility deficits, address ROM deficits, address strength deficits, analyze and cue movement patterns and instruct in home and community integration to attain remaining goals. Progress toward goals / Updated goals:  New Goals to be achieved in __6__  treatments:  1.  Pt will be educated in appropriate long term HEP to decrease pain, increase ROM, increase strength and return pt to PLOF. 2. Pt will demonstrate R knee extension strength 5/5 to promote ability to ambulate reciprocally up/down flight of stairs.   3. Pt will be able to squat to lift 20# from floor for ADLs     PLAN  [x]  Upgrade activities as tolerated yes Continue plan of care   []  Discharge due to :    []  Other:      Therapist: La Matthews PTA    Date: 8/30/2021 Time: 1:08 PM     Future Appointments Date Time Provider Chris Gustafson   9/1/2021  2:45 PM Franklin Plasencia PT [facility]

## 2021-09-01 ENCOUNTER — HOSPITAL ENCOUNTER (OUTPATIENT)
Dept: PHYSICAL THERAPY | Age: 26
Discharge: HOME | End: 2021-09-01
Payer: COMMERCIAL

## 2021-09-01 PROCEDURE — 97110 THERAPEUTIC EXERCISES: CPT

## 2021-09-01 NOTE — PROGRESS NOTES
PHYSICAL THERAPY - DAILY TREATMENT NOTE    Patient Name: Erin Garibay        Date: 2021  : 1995   yes Patient  Verified  Visit #:   15   of   22  Insurance: Payor: Kaycee Rush / Plan: [address] [place] [address] / Product Type: PPO /      In time: 2:45 Out time: 3:38   Total Treatment Time: 53     Medicare/BCBS Time Tracking (below)   Total Timed Codes (min):  43 1:1 Treatment Time:  43     TREATMENT AREA =  Right knee pain [M25.561] a  SUBJECTIVE  Pain Level (on 0 to 10 scale):  0  / 10   Medication Changes/New allergies or changes in medical history, any new surgeries or procedures?    no  If yes, update Summary List   Subjective Functional Status/Changes:  []  No changes reported     \"Today is going to be my last day, I think I'm good\"   ]       OBJECTIVE  Modalities Rationale:     decrease inflammation and decrease pain to improve patient's ability to complete ADLs   min [] Estim, type/location:                                      []  att     []  unatt     []  w/US     []  w/ice    []  w/heat    min []  Mechanical Traction: type/lbs                   []  pro   []  sup   []  int   []  cont    []  before manual    []  after manual    min []  Ultrasound, settings/location:      min []  Iontophoresis w/ dexamethasone, location:                                               []  take home patch       []  in clinic   10 min [x]  Ice     []  Heat    location/position: To R knee in long sit    min []  Vasopneumatic Device, press/temp:    If using vaso (only need to measure limb vaso being performed on)      pre-treatment girth :       post-treatment girth :       measured at (landmark location) :      min []  Other:    [x] Skin assessment post-treatment (if applicable):    [x]  intact    []  redness- no adverse reaction                  []redness  adverse reaction:      43 min Therapeutic Exercise:  [x]  See flow sheet   Rationale:      increase ROM, increase strength, improve coordination, improve balance and increase proprioception to improve the patients ability to complete functional activity     Billed With/As:   [x] TE   [] TA   [] Neuro   [] Self Care Patient Education: [x] Review HEP    [x] Progressed/Changed HEP based on: DC, proided with updated HEP and bands; pt ind as per return demo  [] positioning   [] body mechanics   [] transfers   [] heat/ice application    [] other:      Other Objective/Functional Measures:    See DC summary     Post Treatment Pain Level (on 0 to 10) scale:   0  / 10     ASSESSMENT  Assessment/Changes in Function:     See DC summary     []  See Progress Note/Recertification   Patient will continue to benefit from skilled PT services to n/a to attain remaining goals.    Progress toward goals / Updated goals:    See DC summary     PLAN  []  Upgrade activities as tolerated no Continue plan of care   [x]  Discharge due to : Goals met, program complete    []  Other:      Therapist: Jean Paul, PT    Date: 9/1/2021 Time: 2:40 PM     Future Appointments   Date Time Provider Chris Gustafson   9/1/2021  2:45 PM Diane Rashid, PT [facility]

## 2021-09-01 NOTE — PROGRESS NOTES
[facility] PHYSICAL THERAPY  [address], [place], [address] - Phone: [phone]  Fax: [phone]  DISCHARGE SUMMARY  Patient Name: Asim Ingram : 1995   Treatment/Medical Diagnosis: Right knee pain [M25.561]   Referral Source: Vicki [place], [place]     Date of Initial Visit: 21 Attended Visits: 14 Missed Visits: 0     SUMMARY OF TREATMENT  Pt attended 14 sessions of PT for the tx of R knee pain s/p R ACL sprain. PT tx consisted of therex, manual tx, NMRE, theract, and modalities in order to improve ROM, dec pain, improve strength/stability, balance/proprioception, and gait. CURRENT STATUS  Pt reports 95% overall improvement in sx since start of care. Notes 0/10 average/max pain. Improvements: descend stairs, squatting  Remaining Functional limitations:  Running    Objective:   R knee AROM 0-115  STRENGTH: hip flex 4+/5, abd 4+/5, ext 4+/5, add 4/5, knee flex 4+/5, knee ext 5/5  FUNCTIONAL ASSESSMENT: squat to 65 deg w/ fair+ mechanics; SLS on Aire[employer] x 30\", SL HR 14 (L 12)      Goal/Measure of Progress Goal Met? 1. Pt will be educated in appropriate long-term HEP to dec pain, inc ROM, inc strength and return to PLOF   Status at last Eval: Ongoing ind and compliance w/ HEP Current Status: Progressed HEP, Ind with return demo yes   2. Pt will demo R knee extension strength 5/5 to promote ability to ambulate reciprocally up. down flight of stairs   Status at last Eval: 4+/5 p! Current Status: 5/5 pain-free yes   3. Pt will be able to squat to lift 20# from floor for ADLs   Status at last Eval: Able to squat through 25% ROM Current Status: deadlift 20# kb from floor w/ proper mechanics; bodyweight airsquat to 60 deg w/ fair+ mechanics yes     RECOMMENDATIONS  Discontinue therapy. Progressing towards or have reached established goals.; Pt request DC. If you have any questions/comments please contact us directly at [phone].    Thank you for allowing us to assist in the care of your patient.     Therapist Signature: Laureen Dimas, PT Date: 9/1/2021     Time: 2:50 PM

## 2021-09-01 NOTE — PROGRESS NOTES
Physical Therapy Discharge Instructions  [facility]. [facility]  [facility] Located within [facility]  [address], [address]  P: [phone] F: [phone]    Patient: Jenn Hansen  : 1995    Reason for Discharge From PT:  [x]Met/progressing towards all set goals    []Minimal progress made towards set goals    []Met a plateau in progress/improvement    []Insurance/financial issues    []Other:     Recommendations:   [x] Return to activity with home program as prescribed on print-outs    [] Return to activity with the following modifications:     [] [employer] fitness training     [x] Return to/join local gym    [] Other:      Continue with      [x] Ice [] Heat   as needed for 10-15 minutes to relieve pain  *If pain does not improve after several days, follow-up with your physician for a consult*           Follow up with MD:     [] Upon completion of therapy   [x] As needed      Additional Comments: Good luck getting back into work!!    Thank you for choosing [facility] for your PT needs!       [address] [facility], PT 2021 3:21 PM